# Patient Record
Sex: FEMALE | Race: WHITE | NOT HISPANIC OR LATINO | Employment: OTHER | URBAN - METROPOLITAN AREA
[De-identification: names, ages, dates, MRNs, and addresses within clinical notes are randomized per-mention and may not be internally consistent; named-entity substitution may affect disease eponyms.]

---

## 2017-10-09 ENCOUNTER — HOSPITAL ENCOUNTER (OUTPATIENT)
Facility: HOSPITAL | Age: 82
Discharge: HOME | End: 2017-10-10
Attending: INTERNAL MEDICINE | Admitting: INTERNAL MEDICINE
Payer: MEDICARE

## 2017-10-09 DIAGNOSIS — R10.9 ABDOMINAL PAIN: ICD-10-CM

## 2017-10-09 DIAGNOSIS — N39.0 URINARY TRACT INFECTION WITHOUT HEMATURIA, SITE UNSPECIFIED: Primary | ICD-10-CM

## 2017-10-09 DIAGNOSIS — R10.30 LOWER ABDOMINAL PAIN, UNSPECIFIED: ICD-10-CM

## 2017-10-09 DIAGNOSIS — R10.32 LEFT LOWER QUADRANT PAIN: ICD-10-CM

## 2017-10-09 DIAGNOSIS — J44.9 CHRONIC OBSTRUCTIVE PULMONARY DISEASE, UNSPECIFIED COPD TYPE: ICD-10-CM

## 2017-10-09 DIAGNOSIS — E11.9 NON-INSULIN DEPENDENT TYPE 2 DIABETES MELLITUS: ICD-10-CM

## 2017-10-09 LAB
ALBUMIN SERPL BCP-MCNC: 3.2 G/DL
ALP SERPL-CCNC: 112 U/L
ALT SERPL W/O P-5'-P-CCNC: 7 U/L
AMYLASE SERPL-CCNC: 7 U/L
ANION GAP SERPL CALC-SCNC: 11 MMOL/L
AST SERPL-CCNC: 17 U/L
BACTERIA #/AREA URNS HPF: ABNORMAL /HPF
BASOPHILS # BLD AUTO: 0 K/UL
BASOPHILS NFR BLD: 0.2 %
BILIRUB SERPL-MCNC: 0.2 MG/DL
BILIRUB UR QL STRIP: NEGATIVE
BUN SERPL-MCNC: 16 MG/DL
CALCIUM SERPL-MCNC: 8.2 MG/DL
CHLORIDE SERPL-SCNC: 109 MMOL/L
CLARITY UR: CLEAR
CO2 SERPL-SCNC: 18 MMOL/L
COLOR UR: YELLOW
CREAT SERPL-MCNC: 1.1 MG/DL
DIFFERENTIAL METHOD: ABNORMAL
EOSINOPHIL # BLD AUTO: 0.1 K/UL
EOSINOPHIL NFR BLD: 2 %
ERYTHROCYTE [DISTWIDTH] IN BLOOD BY AUTOMATED COUNT: 15.4 %
EST. GFR  (AFRICAN AMERICAN): 54 ML/MIN/1.73 M^2
EST. GFR  (NON AFRICAN AMERICAN): 47 ML/MIN/1.73 M^2
GLUCOSE SERPL-MCNC: 134 MG/DL
GLUCOSE UR QL STRIP: NEGATIVE
HCT VFR BLD AUTO: 41 %
HGB BLD-MCNC: 13.5 G/DL
HGB UR QL STRIP: NEGATIVE
KETONES UR QL STRIP: NEGATIVE
LEUKOCYTE ESTERASE UR QL STRIP: ABNORMAL
LYMPHOCYTES # BLD AUTO: 3 K/UL
LYMPHOCYTES NFR BLD: 45.6 %
MAGNESIUM SERPL-MCNC: 2.2 MG/DL
MCH RBC QN AUTO: 31.1 PG
MCHC RBC AUTO-ENTMCNC: 33 G/DL
MCV RBC AUTO: 94 FL
MICROSCOPIC COMMENT: ABNORMAL
MONOCYTES # BLD AUTO: 0.6 K/UL
MONOCYTES NFR BLD: 9.3 %
NEUTROPHILS # BLD AUTO: 2.9 K/UL
NEUTROPHILS NFR BLD: 42.9 %
NITRITE UR QL STRIP: NEGATIVE
PH UR STRIP: 7 [PH] (ref 5–8)
PHOSPHATE SERPL-MCNC: 3.1 MG/DL
PLATELET # BLD AUTO: 167 K/UL
PMV BLD AUTO: 9.2 FL
POCT GLUCOSE: 110 MG/DL (ref 70–110)
POTASSIUM SERPL-SCNC: 4.4 MMOL/L
PROT SERPL-MCNC: 6.4 G/DL
PROT UR QL STRIP: NEGATIVE
RBC # BLD AUTO: 4.35 M/UL
SODIUM SERPL-SCNC: 138 MMOL/L
SP GR UR STRIP: 1.01 (ref 1–1.03)
URN SPEC COLLECT METH UR: ABNORMAL
UROBILINOGEN UR STRIP-ACNC: NEGATIVE EU/DL
WBC # BLD AUTO: 6.7 K/UL
WBC #/AREA URNS HPF: 10 /HPF (ref 0–5)

## 2017-10-09 PROCEDURE — 94760 N-INVAS EAR/PLS OXIMETRY 1: CPT

## 2017-10-09 PROCEDURE — 83735 ASSAY OF MAGNESIUM: CPT

## 2017-10-09 PROCEDURE — 84100 ASSAY OF PHOSPHORUS: CPT

## 2017-10-09 PROCEDURE — 87086 URINE CULTURE/COLONY COUNT: CPT

## 2017-10-09 PROCEDURE — 36415 COLL VENOUS BLD VENIPUNCTURE: CPT

## 2017-10-09 PROCEDURE — 85025 COMPLETE CBC W/AUTO DIFF WBC: CPT

## 2017-10-09 PROCEDURE — 99219 PR INITIAL OBSERVATION CARE,LEVL II: CPT | Mod: ,,, | Performed by: INTERNAL MEDICINE

## 2017-10-09 PROCEDURE — 80053 COMPREHEN METABOLIC PANEL: CPT

## 2017-10-09 PROCEDURE — G0378 HOSPITAL OBSERVATION PER HR: HCPCS

## 2017-10-09 PROCEDURE — 82150 ASSAY OF AMYLASE: CPT

## 2017-10-09 PROCEDURE — 25000003 PHARM REV CODE 250: Performed by: INTERNAL MEDICINE

## 2017-10-09 PROCEDURE — 81000 URINALYSIS NONAUTO W/SCOPE: CPT

## 2017-10-09 PROCEDURE — 25500020 PHARM REV CODE 255

## 2017-10-09 PROCEDURE — G0379 DIRECT REFER HOSPITAL OBSERV: HCPCS

## 2017-10-09 PROCEDURE — 82962 GLUCOSE BLOOD TEST: CPT

## 2017-10-09 PROCEDURE — 99900035 HC TECH TIME PER 15 MIN (STAT)

## 2017-10-09 RX ORDER — POTASSIUM CHLORIDE 20 MEQ/15ML
40 SOLUTION ORAL
Status: DISCONTINUED | OUTPATIENT
Start: 2017-10-09 | End: 2017-10-10 | Stop reason: HOSPADM

## 2017-10-09 RX ORDER — ALENDRONATE SODIUM 70 MG/1
70 TABLET ORAL
COMMUNITY
End: 2018-04-04

## 2017-10-09 RX ORDER — BISACODYL 10 MG
10 SUPPOSITORY, RECTAL RECTAL DAILY PRN
Status: DISCONTINUED | OUTPATIENT
Start: 2017-10-09 | End: 2017-10-10 | Stop reason: HOSPADM

## 2017-10-09 RX ORDER — HYDROMORPHONE HYDROCHLORIDE 2 MG/1
2 TABLET ORAL 2 TIMES DAILY PRN
Status: DISCONTINUED | OUTPATIENT
Start: 2017-10-09 | End: 2017-10-10 | Stop reason: HOSPADM

## 2017-10-09 RX ORDER — HYDROMORPHONE HYDROCHLORIDE 8 MG/1
8 TABLET ORAL EVERY 4 HOURS PRN
COMMUNITY
End: 2018-04-04

## 2017-10-09 RX ORDER — SODIUM,POTASSIUM PHOSPHATES 280-250MG
2 POWDER IN PACKET (EA) ORAL
Status: DISCONTINUED | OUTPATIENT
Start: 2017-10-09 | End: 2017-10-10 | Stop reason: HOSPADM

## 2017-10-09 RX ORDER — METHOCARBAMOL 500 MG/1
500 TABLET, FILM COATED ORAL 2 TIMES DAILY
Status: DISCONTINUED | OUTPATIENT
Start: 2017-10-09 | End: 2017-10-09

## 2017-10-09 RX ORDER — LANOLIN ALCOHOL/MO/W.PET/CERES
800 CREAM (GRAM) TOPICAL
Status: DISCONTINUED | OUTPATIENT
Start: 2017-10-09 | End: 2017-10-10 | Stop reason: HOSPADM

## 2017-10-09 RX ORDER — DOCUSATE SODIUM 100 MG/1
100 CAPSULE, LIQUID FILLED ORAL DAILY
COMMUNITY
End: 2018-04-04

## 2017-10-09 RX ORDER — ONDANSETRON 4 MG/1
8 TABLET, FILM COATED ORAL 2 TIMES DAILY
COMMUNITY
Start: 2017-10-09 | End: 2017-10-24

## 2017-10-09 RX ORDER — IBUPROFEN 200 MG
24 TABLET ORAL
Status: DISCONTINUED | OUTPATIENT
Start: 2017-10-09 | End: 2017-10-10 | Stop reason: HOSPADM

## 2017-10-09 RX ORDER — GLUCAGON 1 MG
1 KIT INJECTION
Status: DISCONTINUED | OUTPATIENT
Start: 2017-10-09 | End: 2017-10-10 | Stop reason: HOSPADM

## 2017-10-09 RX ORDER — LORAZEPAM 0.5 MG/1
0.5 TABLET ORAL EVERY 6 HOURS PRN
Status: DISCONTINUED | OUTPATIENT
Start: 2017-10-09 | End: 2017-10-10 | Stop reason: HOSPADM

## 2017-10-09 RX ORDER — ENALAPRIL MALEATE 2.5 MG/1
2.5 TABLET ORAL DAILY
Status: DISCONTINUED | OUTPATIENT
Start: 2017-10-10 | End: 2017-10-10 | Stop reason: HOSPADM

## 2017-10-09 RX ORDER — INSULIN ASPART 100 [IU]/ML
0-5 INJECTION, SOLUTION INTRAVENOUS; SUBCUTANEOUS
Status: DISCONTINUED | OUTPATIENT
Start: 2017-10-09 | End: 2017-10-10 | Stop reason: HOSPADM

## 2017-10-09 RX ORDER — FLUTICASONE FUROATE AND VILANTEROL 100; 25 UG/1; UG/1
1 POWDER RESPIRATORY (INHALATION) DAILY
Status: DISCONTINUED | OUTPATIENT
Start: 2017-10-10 | End: 2017-10-10 | Stop reason: HOSPADM

## 2017-10-09 RX ORDER — QUETIAPINE 200 MG/1
200 TABLET, FILM COATED, EXTENDED RELEASE ORAL DAILY
Status: DISCONTINUED | OUTPATIENT
Start: 2017-10-10 | End: 2017-10-09 | Stop reason: CLARIF

## 2017-10-09 RX ORDER — POTASSIUM CHLORIDE 20 MEQ/15ML
60 SOLUTION ORAL
Status: DISCONTINUED | OUTPATIENT
Start: 2017-10-09 | End: 2017-10-10 | Stop reason: HOSPADM

## 2017-10-09 RX ORDER — GABAPENTIN 300 MG/1
300 CAPSULE ORAL NIGHTLY
COMMUNITY

## 2017-10-09 RX ORDER — IBUPROFEN 200 MG
16 TABLET ORAL
Status: DISCONTINUED | OUTPATIENT
Start: 2017-10-09 | End: 2017-10-10 | Stop reason: HOSPADM

## 2017-10-09 RX ORDER — RAMELTEON 8 MG/1
8 TABLET ORAL NIGHTLY PRN
Status: DISCONTINUED | OUTPATIENT
Start: 2017-10-09 | End: 2017-10-10 | Stop reason: HOSPADM

## 2017-10-09 RX ORDER — LUBIPROSTONE 8 UG/1
24 CAPSULE ORAL NIGHTLY
Status: DISCONTINUED | OUTPATIENT
Start: 2017-10-09 | End: 2017-10-10 | Stop reason: HOSPADM

## 2017-10-09 RX ORDER — ACETAMINOPHEN 325 MG/1
650 TABLET ORAL EVERY 8 HOURS PRN
Status: DISCONTINUED | OUTPATIENT
Start: 2017-10-09 | End: 2017-10-10 | Stop reason: HOSPADM

## 2017-10-09 RX ORDER — ONDANSETRON 2 MG/ML
8 INJECTION INTRAMUSCULAR; INTRAVENOUS EVERY 6 HOURS PRN
Status: DISCONTINUED | OUTPATIENT
Start: 2017-10-09 | End: 2017-10-10 | Stop reason: HOSPADM

## 2017-10-09 RX ORDER — HYDROCODONE BITARTRATE AND ACETAMINOPHEN 5; 325 MG/1; MG/1
1 TABLET ORAL EVERY 4 HOURS PRN
Status: DISCONTINUED | OUTPATIENT
Start: 2017-10-09 | End: 2017-10-10 | Stop reason: HOSPADM

## 2017-10-09 RX ORDER — QUETIAPINE FUMARATE 100 MG/1
100 TABLET, FILM COATED ORAL 2 TIMES DAILY
Status: DISCONTINUED | OUTPATIENT
Start: 2017-10-09 | End: 2017-10-10 | Stop reason: HOSPADM

## 2017-10-09 RX ORDER — PANTOPRAZOLE SODIUM 40 MG/10ML
40 INJECTION, POWDER, LYOPHILIZED, FOR SOLUTION INTRAVENOUS DAILY
Status: DISCONTINUED | OUTPATIENT
Start: 2017-10-10 | End: 2017-10-10 | Stop reason: HOSPADM

## 2017-10-09 RX ORDER — IPRATROPIUM BROMIDE AND ALBUTEROL SULFATE 2.5; .5 MG/3ML; MG/3ML
3 SOLUTION RESPIRATORY (INHALATION) EVERY 6 HOURS PRN
Status: DISCONTINUED | OUTPATIENT
Start: 2017-10-09 | End: 2017-10-09 | Stop reason: SDUPTHER

## 2017-10-09 RX ORDER — IPRATROPIUM BROMIDE AND ALBUTEROL SULFATE 2.5; .5 MG/3ML; MG/3ML
3 SOLUTION RESPIRATORY (INHALATION) EVERY 6 HOURS PRN
Status: DISCONTINUED | OUTPATIENT
Start: 2017-10-09 | End: 2017-10-10 | Stop reason: HOSPADM

## 2017-10-09 RX ORDER — SODIUM CHLORIDE 450 MG/100ML
INJECTION, SOLUTION INTRAVENOUS CONTINUOUS
Status: DISCONTINUED | OUTPATIENT
Start: 2017-10-09 | End: 2017-10-10 | Stop reason: HOSPADM

## 2017-10-09 RX ORDER — ENOXAPARIN SODIUM 100 MG/ML
40 INJECTION SUBCUTANEOUS EVERY 24 HOURS
Status: DISCONTINUED | OUTPATIENT
Start: 2017-10-09 | End: 2017-10-10 | Stop reason: HOSPADM

## 2017-10-09 RX ADMIN — QUETIAPINE FUMARATE 100 MG: 100 TABLET, FILM COATED ORAL at 08:10

## 2017-10-09 RX ADMIN — SODIUM CHLORIDE: 0.45 INJECTION, SOLUTION INTRAVENOUS at 06:10

## 2017-10-09 RX ADMIN — LUBIPROSTONE 24 MCG: 8 CAPSULE, GELATIN COATED ORAL at 08:10

## 2017-10-09 RX ADMIN — IOHEXOL 75 ML: 350 INJECTION, SOLUTION INTRAVENOUS at 08:10
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
## 2017-10-10 VITALS
DIASTOLIC BLOOD PRESSURE: 66 MMHG | RESPIRATION RATE: 16 BRPM | HEART RATE: 66 BPM | TEMPERATURE: 98 F | SYSTOLIC BLOOD PRESSURE: 138 MMHG | HEIGHT: 66 IN | WEIGHT: 106.5 LBS | BODY MASS INDEX: 17.12 KG/M2 | OXYGEN SATURATION: 96 %

## 2017-10-10 LAB
BASOPHILS # BLD AUTO: 0 K/UL
BASOPHILS NFR BLD: 0.8 %
DIFFERENTIAL METHOD: ABNORMAL
EOSINOPHIL # BLD AUTO: 0.2 K/UL
EOSINOPHIL NFR BLD: 3.5 %
ERYTHROCYTE [DISTWIDTH] IN BLOOD BY AUTOMATED COUNT: 14.6 %
HCT VFR BLD AUTO: 41 %
HGB BLD-MCNC: 13.4 G/DL
LIPASE SERPL-CCNC: 7 U/L
LYMPHOCYTES # BLD AUTO: 2.6 K/UL
LYMPHOCYTES NFR BLD: 46.2 %
MCH RBC QN AUTO: 30.6 PG
MCHC RBC AUTO-ENTMCNC: 32.7 G/DL
MCV RBC AUTO: 94 FL
MONOCYTES # BLD AUTO: 0.7 K/UL
MONOCYTES NFR BLD: 11.7 %
NEUTROPHILS # BLD AUTO: 2.2 K/UL
NEUTROPHILS NFR BLD: 37.8 %
PLATELET # BLD AUTO: 164 K/UL
PMV BLD AUTO: 9.7 FL
POCT GLUCOSE: 149 MG/DL (ref 70–110)
POCT GLUCOSE: 84 MG/DL (ref 70–110)
RBC # BLD AUTO: 4.39 M/UL
WBC # BLD AUTO: 5.7 K/UL

## 2017-10-10 PROCEDURE — 25000003 PHARM REV CODE 250: Performed by: INTERNAL MEDICINE

## 2017-10-10 PROCEDURE — C9113 INJ PANTOPRAZOLE SODIUM, VIA: HCPCS | Performed by: INTERNAL MEDICINE

## 2017-10-10 PROCEDURE — 96374 THER/PROPH/DIAG INJ IV PUSH: CPT

## 2017-10-10 PROCEDURE — 99217 PR OBSERVATION CARE DISCHARGE: CPT | Mod: ,,, | Performed by: INTERNAL MEDICINE

## 2017-10-10 PROCEDURE — G0378 HOSPITAL OBSERVATION PER HR: HCPCS

## 2017-10-10 PROCEDURE — 63600175 PHARM REV CODE 636 W HCPCS: Performed by: INTERNAL MEDICINE

## 2017-10-10 PROCEDURE — 94640 AIRWAY INHALATION TREATMENT: CPT

## 2017-10-10 PROCEDURE — 83690 ASSAY OF LIPASE: CPT

## 2017-10-10 PROCEDURE — 36415 COLL VENOUS BLD VENIPUNCTURE: CPT

## 2017-10-10 PROCEDURE — 99900035 HC TECH TIME PER 15 MIN (STAT)

## 2017-10-10 PROCEDURE — 25000242 PHARM REV CODE 250 ALT 637 W/ HCPCS: Performed by: INTERNAL MEDICINE

## 2017-10-10 PROCEDURE — 94760 N-INVAS EAR/PLS OXIMETRY 1: CPT

## 2017-10-10 PROCEDURE — 96366 THER/PROPH/DIAG IV INF ADDON: CPT

## 2017-10-10 PROCEDURE — 96361 HYDRATE IV INFUSION ADD-ON: CPT

## 2017-10-10 PROCEDURE — 85025 COMPLETE CBC W/AUTO DIFF WBC: CPT

## 2017-10-10 PROCEDURE — 82962 GLUCOSE BLOOD TEST: CPT

## 2017-10-10 RX ORDER — CIPROFLOXACIN 500 MG/1
500 TABLET ORAL EVERY 12 HOURS
Qty: 10 TABLET | Refills: 0 | Status: SHIPPED | OUTPATIENT
Start: 2017-10-10 | End: 2018-04-04

## 2017-10-10 RX ORDER — PHENAZOPYRIDINE HYDROCHLORIDE 100 MG/1
100 TABLET, FILM COATED ORAL 3 TIMES DAILY PRN
Qty: 10 TABLET | Refills: 0 | Status: SHIPPED | OUTPATIENT
Start: 2017-10-10 | End: 2017-10-20

## 2017-10-10 RX ORDER — CIPROFLOXACIN 500 MG/1
500 TABLET ORAL EVERY 12 HOURS
Status: DISCONTINUED | OUTPATIENT
Start: 2017-10-10 | End: 2017-10-10 | Stop reason: HOSPADM

## 2017-10-10 RX ORDER — METHOCARBAMOL 500 MG/1
500 TABLET, FILM COATED ORAL DAILY
Status: ON HOLD | COMMUNITY
End: 2018-04-04

## 2017-10-10 RX ORDER — METHOCARBAMOL 500 MG/1
500 TABLET, FILM COATED ORAL 2 TIMES DAILY PRN
Status: DISCONTINUED | OUTPATIENT
Start: 2017-10-10 | End: 2017-10-10 | Stop reason: HOSPADM

## 2017-10-10 RX ORDER — DOCUSATE SODIUM 100 MG/1
100 CAPSULE, LIQUID FILLED ORAL DAILY
Status: DISCONTINUED | OUTPATIENT
Start: 2017-10-10 | End: 2017-10-10 | Stop reason: HOSPADM

## 2017-10-10 RX ADMIN — LEVOTHYROXINE SODIUM 125 MCG: 25 TABLET ORAL at 05:10

## 2017-10-10 RX ADMIN — BISACODYL 10 MG: 10 SUPPOSITORY RECTAL at 10:10

## 2017-10-10 RX ADMIN — SODIUM CHLORIDE: 0.45 INJECTION, SOLUTION INTRAVENOUS at 06:10

## 2017-10-10 RX ADMIN — HYDROMORPHONE HYDROCHLORIDE 2 MG: 2 TABLET ORAL at 09:10

## 2017-10-10 RX ADMIN — FLUTICASONE FUROATE AND VILANTEROL TRIFENATATE 1 PUFF: 100; 25 POWDER RESPIRATORY (INHALATION) at 07:10

## 2017-10-10 RX ADMIN — DOCUSATE SODIUM 100 MG: 100 CAPSULE, LIQUID FILLED ORAL at 10:10

## 2017-10-10 RX ADMIN — PANTOPRAZOLE SODIUM 40 MG: 40 INJECTION, POWDER, FOR SOLUTION INTRAVENOUS at 09:10

## 2017-10-10 RX ADMIN — ENALAPRIL MALEATE 2.5 MG: 2.5 TABLET ORAL at 09:10

## 2017-10-10 RX ADMIN — LORAZEPAM 0.5 MG: 0.5 TABLET ORAL at 12:10

## 2017-10-10 RX ADMIN — CIPROFLOXACIN 500 MG: 500 TABLET, FILM COATED ORAL at 10:10

## 2017-10-10 RX ADMIN — LORAZEPAM 0.5 MG: 0.5 TABLET ORAL at 01:10

## 2017-10-10 RX ADMIN — METHOCARBAMOL 500 MG: 500 TABLET ORAL at 10:10

## 2017-10-10 RX ADMIN — HYDROCODONE BITARTRATE AND ACETAMINOPHEN 1 TABLET: 5; 325 TABLET ORAL at 06:10

## 2017-10-10 RX ADMIN — QUETIAPINE FUMARATE 100 MG: 100 TABLET, FILM COATED ORAL at 09:10

## 2017-10-10 NOTE — PLAN OF CARE
Spoke with the pt's nurse, Oksana regarding the CT abd results. Dr Esets is unable to determine plan of care or discharge without the results.....ALEC Hidalgo CM

## 2017-10-10 NOTE — PLAN OF CARE
CT abd results received at 11:12am and Dr Estes put discharge orders in Epic at 11:58am....Monroe Hidalgo CM

## 2017-10-10 NOTE — NURSING
Spoke with Ct and advised it was ok to give patient medications that it will not hinder any testing.

## 2017-10-10 NOTE — PLAN OF CARE
10/10/17 0742   Patient Assessment/Suction   Level of Consciousness (AVPU) alert   All Lung Fields Breath Sounds diminished   PRE-TX-O2-ETCO2   O2 Device (Oxygen Therapy) room air   SpO2 97 %   Pulse 60   Resp (!) 97   Aerosol Therapy   $ Aerosol Therapy Charges PRN treatment not required   Inhaler   $ Inhaler Charges MDI (Metered Dose Inahler) Treatment;Mouth rinsed post treatment   Respiratory Treatment Status given   SVN/Inhaler Treatment Route mouthpiece   Patient Tolerance good   Post-Treatment   Post-treatment Heart Rate (beats/min) 62   Post-treatment Resp Rate (breaths/min) 18   All Fields Breath Sounds unchanged

## 2017-10-10 NOTE — PLAN OF CARE
Problem: Patient Care Overview  Goal: Plan of Care Review  Outcome: Outcome(s) achieved Date Met: 10/10/17  Pt assisted to BSC as needed. Pt's daughter called to  pt after her procedure.

## 2017-10-10 NOTE — DISCHARGE SUMMARY
Discharge Summary  Hospital Medicine    Admit Date: 10/9/2017    Date and Time: 10/10/740355:59 AM    Discharge Attending Physician: Josselyn Estes MD    Primary Care Physician: Nikhil Albrecht MD    Diagnoses:  Active Hospital Problems    Diagnosis  POA    *Left lower quadrant pain [R10.32] secondary to acute cystitis  Acute Cystitis  Yes    Non-insulin dependent type 2 diabetes mellitus [E11.9]  Yes    Chronic obstructive pulmonary disease [J44.9]  Yes    Chronic low back pain [M54.5, G89.29]  Yes    Crohn's disease without complication [K50.90]  Yes                    Discharged Condition: Good    Hospital Course:   Patient is a 83 y.o. female admitted to Hospitalist Service from Dr. Albrecht's office with complaint of lower abdominal pain. Patient has PMH significant for DM-2, hypothyroidism, Crohn's disease, GERD, hyperlipidemia and COPD. Patient is a poor historian and exhibiting opiate seeking behavior. Patient has an implantable Dilaudid pump managed by pain specialist. Part of the history obtained from patient's daughter. Patient had been having off and on lower abdominal pain. Patient denied any melena, bleeding per rectum, diarrhea, mucus per rectum, fever, nausea or vomiting. Patient has low appetite and not eating and drinking much and has lost some weight over few months. Patient reported abdominal pain as bladder spasm pain. No definite dysuria reported. Patient denied chest pain, shortness of breath, headache, vision changes, focal neuro-deficits, cough or fever. Patient was admitted to Hospitalist medicine service. Patient was noted to ave acute UTI which was treated with IV antibiotics. Symptoms improved. Patient exhibited opiate seeking behavior during hospital stay. Symptoms improved. Patient was discharged home in stable condition with following discharge plan of care.     Consults: None    Significant Diagnostic Studies:   Large hiatal hernia with most of the stomach seen in the right  hemithorax. Atelectasis of the right lower lobe small right pleural effusion with trace left pleural effusion. Prior cholecystectomy prior appendectomy. Prior hysterectomy. Extensive atherosclerotic calcification of the aorta, pelvic vessels, renal and mesenteric vessels. Right intrarenal stone without hydronephrosis. A TENS battery pack is identified in the left abdominal wall. Constipation.    Microbiology Results (last 7 days)     Procedure Component Value Units Date/Time    Urine culture [[id number]] Collected:  10/09/17 1938    Order Status:  Sent Specimen:  Urine from Urine, Clean Catch Updated:  10/10/17 1007        Special Treatments/Procedures: None  Disposition: Home or Self Care    Medications:  Reconciled Home Medications: Current Discharge Medication List      START taking these medications    Details   ciprofloxacin HCl (CIPRO) 500 MG tablet Take 1 tablet (500 mg total) by mouth every 12 (twelve) hours.  Qty: 10 tablet, Refills: 0      phenazopyridine (PYRIDIUM) 100 MG tablet Take 1 tablet (100 mg total) by mouth 3 (three) times daily as needed for Pain.  Qty: 10 tablet, Refills: 0         CONTINUE these medications which have NOT CHANGED    Details   alendronate (FOSAMAX) 70 MG tablet Take 70 mg by mouth every 7 days.      CIMZIA 400 mg/2 mL (200 mg/mL x 2) SyKt kit Once monthly with last dose 12/10/15      docusate sodium (COLACE) 100 MG capsule Take 100 mg by mouth once daily.      enalapril (VASOTEC) 2.5 MG tablet Take 5 mg by mouth once daily.       fluticasone-vilanterol (BREO ELLIPTA) 100-25 mcg/dose diskus inhaler Inhale 1 puff into the lungs once daily.      gabapentin (NEURONTIN) 300 MG capsule Take 300 mg by mouth 2 (two) times daily.      HYDROmorphone (DILAUDID) 8 MG tablet Take 8 mg by mouth every 4 (four) hours as needed for Pain. Take 1/2 tab to 1 tab by mouth q4-6h prn pain      HYDROMORPHONE HCL (HYDROMORPHONE, BULK, MISC) Inject 6.2117 mg into the vein once daily. Implanted pain pump       levothyroxine (SYNTHROID) 75 MCG tablet Take 125 mcg by mouth once daily.       lorazepam (ATIVAN) 1 MG tablet Take 1 tablet (1 mg total) by mouth every 8 (eight) hours as needed for Anxiety.      lubiprostone (AMITIZA) 24 MCG Cap Take 24 mcg by mouth 2 (two) times daily with meals.       methocarbamol (ROBAXIN) 500 MG Tab Take 500 mg by mouth 2 (two) times daily as needed.      ondansetron (ZOFRAN) 4 MG tablet Take 8 mg by mouth 2 (two) times daily.      pantoprazole (PROTONIX) 40 MG tablet Take 40 mg by mouth once daily.       quetiapine (SEROQUEL XR) 200 MG Tb24 Take by mouth every evening.       ranitidine (ZANTAC) 75 MG tablet Take 150 mg by mouth nightly.       simvastatin (ZOCOR) 40 MG tablet Take 40 mg by mouth once daily.       temazepam (RESTORIL) 22.5 MG capsule Take 30 mg by mouth every evening.       ZETIA 10 mg tablet every evening.       albuterol-ipratropium 2.5mg-0.5mg/3mL (DUO-NEB) 0.5 mg-3 mg(2.5 mg base)/3 mL nebulizer solution Take 3 mLs by nebulization every 6 (six) hours as needed for Wheezing.  Qty: 120 vial, Refills: 0         STOP taking these medications       albuterol 90 mcg/actuation inhaler Comments:   Reason for Stopping:               Discharge Procedure Orders  Diet general   Order Comments: Cardiac/ 2 gram sodium low cholesterol diet     Diet Diabetic 1800 Calories     Other restrictions (specify):   Order Comments: Fall precautions     Call MD for:   Order Comments: For worsening symptoms, chest pain, shortness of breath, increased abdominal pain, high grade fever, stroke or stroke like symptoms, immediately go to the nearest Emergency Room or call 911 as soon as possible.       Follow-up Information     Nikhil Albrecht MD On 10/27/2017.    Specialty:  Internal Medicine  Why:  @10:00am   Contact information:  [address] [address] [address]  [phone]

## 2017-10-10 NOTE — PLAN OF CARE
Problem: Patient Care Overview  Goal: Plan of Care Review  Outcome: Ongoing (interventions implemented as appropriate)  Patient alert and oriented resting in bed. NAD. Denies SOB. C/o abd pain and bladder spasms, Prn medication given with relief. Room air.    VSS. Plan of care reviewed with patient. Verbalizes understanding.Call light in reach. Pt free from fall or injury. Will monitor.

## 2017-10-10 NOTE — PLAN OF CARE
10/09/17 1942   Patient Assessment/Suction   Level of Consciousness (AVPU) alert  (Simultaneous filing. User may not have seen previous data.)   Respiratory Effort Normal;Unlabored   Expansion/Accessory Muscles/Retractions no use of accessory muscles   PRE-TX-O2-ETCO2   O2 Device (Oxygen Therapy) room air  (Simultaneous filing. User may not have seen previous data.)   SpO2 96 %  (Simultaneous filing. User may not have seen previous data.)   Pulse (!) 56  (Simultaneous filing. User may not have seen previous data.)   Resp 17   Temp 98 °F (36.7 °C)   BP (!) 174/74   Aerosol Therapy   $ Aerosol Therapy Charges PRN treatment not required   Respiratory Treatment Status PRN treatment not required   Pt tolerates RA and treatments well.

## 2017-10-10 NOTE — PLAN OF CARE
"PCP is Dr Albrecht.  Verified insurance as [employer].  Pharmacy is [facility]; added in epic.  Patient lives alone in a "little cottage" behind her daughter Sepideh's home.  Sepideh is her POA and she drives patient as needed.  Patient denies HH, however states she goes twice a week for therapy at [facility] in [place].  D/c plan is home; no needs anticipated.       10/10/17 1138   Discharge Assessment   Assessment Type Discharge Planning Assessment   Confirmed/corrected address and phone number on facesheet? Yes   Assessment information obtained from? Patient   Prior to hospitilization cognitive status: Alert/Oriented   Prior to hospitalization functional status: Independent;Assistive Equipment   Current cognitive status: Alert/Oriented   Current Functional Status: Independent;Assistive Equipment   Lives With alone   Able to Return to Prior Arrangements yes   Is patient able to care for self after discharge? Yes   Patient's perception of discharge disposition home or selfcare   Readmission Within The Last 30 Days no previous admission in last 30 days   Patient currently being followed by outpatient case management? No   Patient currently receives any other outside agency services? Yes   Name and contact number of agency or person providing outside services Outpatient PT at [facility] in [place]   Equipment Currently Used at Home oxygen;nebulizer;cane, straight;walker, rolling;wheelchair;shower chair;bedside commode  (oxygen is prn)   Do you have any problems affording any of your prescribed medications? No   Is the patient taking medications as prescribed? yes   Does the patient have transportation home? Yes   Transportation Available family or friend will provide   Does the patient receive services at the [facility]? No   Discharge Plan A Home   Patient/Family In Agreement With Plan yes     "

## 2017-10-11 NOTE — PLAN OF CARE
10/11/17 1032   Final Note   Assessment Type Final Discharge Note   Discharge Disposition Home   Hospital Follow Up  Appt(s) scheduled? Yes   Discharge plans and expectations educations in teach back method with documentation complete? Yes

## 2017-10-12 LAB
BACTERIA UR CULT: NORMAL
BACTERIA UR CULT: NORMAL

## 2018-04-04 ENCOUNTER — HOSPITAL ENCOUNTER (OUTPATIENT)
Facility: HOSPITAL | Age: 83
LOS: 1 days | Discharge: NURSING, REHAB OR LONG-TERM CARE | End: 2018-04-06
Attending: EMERGENCY MEDICINE | Admitting: INTERNAL MEDICINE
Payer: MEDICARE

## 2018-04-04 DIAGNOSIS — R79.89 PRERENAL AZOTEMIA: ICD-10-CM

## 2018-04-04 DIAGNOSIS — E11.69 TYPE 2 DIABETES MELLITUS WITH OTHER SPECIFIED COMPLICATION, UNSPECIFIED LONG TERM INSULIN USE STATUS: ICD-10-CM

## 2018-04-04 DIAGNOSIS — R53.81 DEBILITY: ICD-10-CM

## 2018-04-04 DIAGNOSIS — D64.9 NORMOCYTIC ANEMIA: ICD-10-CM

## 2018-04-04 DIAGNOSIS — G30.9 ALZHEIMER'S DEMENTIA WITHOUT BEHAVIORAL DISTURBANCE, UNSPECIFIED TIMING OF DEMENTIA ONSET: ICD-10-CM

## 2018-04-04 DIAGNOSIS — E86.0 DEHYDRATION: ICD-10-CM

## 2018-04-04 DIAGNOSIS — D64.9 ANEMIA, UNSPECIFIED TYPE: Primary | ICD-10-CM

## 2018-04-04 DIAGNOSIS — R41.82 ALTERED MENTAL STATUS, UNSPECIFIED ALTERED MENTAL STATUS TYPE: ICD-10-CM

## 2018-04-04 DIAGNOSIS — J44.9 CHRONIC OBSTRUCTIVE PULMONARY DISEASE, UNSPECIFIED COPD TYPE: ICD-10-CM

## 2018-04-04 DIAGNOSIS — R41.82 ALTERED MENTAL STATUS: ICD-10-CM

## 2018-04-04 DIAGNOSIS — F02.80 ALZHEIMER'S DEMENTIA WITHOUT BEHAVIORAL DISTURBANCE, UNSPECIFIED TIMING OF DEMENTIA ONSET: ICD-10-CM

## 2018-04-04 PROBLEM — I95.9 HYPOTENSION: Status: ACTIVE | Noted: 2018-04-04

## 2018-04-04 PROBLEM — R31.9 HEMATURIA: Status: ACTIVE | Noted: 2018-04-04

## 2018-04-04 PROBLEM — F03.90 DEMENTIA: Status: ACTIVE | Noted: 2018-04-04

## 2018-04-04 PROBLEM — Z98.890 STATUS POST HIP SURGERY: Status: ACTIVE | Noted: 2018-04-04

## 2018-04-04 PROBLEM — K44.9 HERNIA, HIATAL: Status: ACTIVE | Noted: 2018-04-04

## 2018-04-04 PROBLEM — J18.9 PNEUMONIA: Status: ACTIVE | Noted: 2018-04-04

## 2018-04-04 PROBLEM — G89.4 CHRONIC PAIN SYNDROME: Status: ACTIVE | Noted: 2018-04-04

## 2018-04-04 LAB
ABO + RH BLD: NORMAL
ALBUMIN SERPL BCP-MCNC: 2.3 G/DL
ALP SERPL-CCNC: 93 U/L
ALT SERPL W/O P-5'-P-CCNC: 31 U/L
ANION GAP SERPL CALC-SCNC: 8 MMOL/L
AST SERPL-CCNC: 58 U/L
BACTERIA #/AREA URNS HPF: ABNORMAL /HPF
BACTERIA #/AREA URNS HPF: ABNORMAL /HPF
BASOPHILS # BLD AUTO: 0 K/UL
BASOPHILS NFR BLD: 0.4 %
BILIRUB SERPL-MCNC: 0.2 MG/DL
BILIRUB UR QL STRIP: NEGATIVE
BILIRUB UR QL STRIP: NEGATIVE
BLD GP AB SCN CELLS X3 SERPL QL: NORMAL
BUN SERPL-MCNC: 24 MG/DL
CALCIUM SERPL-MCNC: 8.2 MG/DL
CHLORIDE SERPL-SCNC: 103 MMOL/L
CLARITY UR: CLEAR
CLARITY UR: CLEAR
CO2 SERPL-SCNC: 26 MMOL/L
COLOR UR: YELLOW
COLOR UR: YELLOW
CREAT SERPL-MCNC: 1.1 MG/DL
DIFFERENTIAL METHOD: ABNORMAL
EOSINOPHIL # BLD AUTO: 0 K/UL
EOSINOPHIL NFR BLD: 0.5 %
ERYTHROCYTE [DISTWIDTH] IN BLOOD BY AUTOMATED COUNT: 15.2 %
EST. GFR  (AFRICAN AMERICAN): 54 ML/MIN/1.73 M^2
EST. GFR  (NON AFRICAN AMERICAN): 47 ML/MIN/1.73 M^2
FERRITIN SERPL-MCNC: 156 NG/ML
FOLATE SERPL-MCNC: 8.5 NG/ML
GLUCOSE SERPL-MCNC: 123 MG/DL
GLUCOSE SERPL-MCNC: 151 MG/DL (ref 70–110)
GLUCOSE UR QL STRIP: NEGATIVE
GLUCOSE UR QL STRIP: NEGATIVE
HCT VFR BLD AUTO: 26.3 %
HGB BLD-MCNC: 8.4 G/DL
HGB UR QL STRIP: ABNORMAL
HGB UR QL STRIP: ABNORMAL
INR PPP: 1
IRON SERPL-MCNC: 16 UG/DL
KETONES UR QL STRIP: NEGATIVE
KETONES UR QL STRIP: NEGATIVE
LEUKOCYTE ESTERASE UR QL STRIP: ABNORMAL
LEUKOCYTE ESTERASE UR QL STRIP: ABNORMAL
LYMPHOCYTES # BLD AUTO: 1.3 K/UL
LYMPHOCYTES NFR BLD: 21.2 %
MCH RBC QN AUTO: 30.4 PG
MCHC RBC AUTO-ENTMCNC: 32 G/DL
MCV RBC AUTO: 95 FL
MICROSCOPIC COMMENT: ABNORMAL
MICROSCOPIC COMMENT: ABNORMAL
MONOCYTES # BLD AUTO: 0.4 K/UL
MONOCYTES NFR BLD: 7.1 %
NEUTROPHILS # BLD AUTO: 4.2 K/UL
NEUTROPHILS NFR BLD: 70.8 %
NITRITE UR QL STRIP: NEGATIVE
NITRITE UR QL STRIP: NEGATIVE
PH UR STRIP: 7 [PH] (ref 5–8)
PH UR STRIP: 7 [PH] (ref 5–8)
PLATELET # BLD AUTO: 286 K/UL
PMV BLD AUTO: 7.9 FL
POCT GLUCOSE: 151 MG/DL (ref 70–110)
POTASSIUM SERPL-SCNC: 4.9 MMOL/L
PROT SERPL-MCNC: 6.3 G/DL
PROT UR QL STRIP: ABNORMAL
PROT UR QL STRIP: NEGATIVE
PROTHROMBIN TIME: 10 SEC
RBC # BLD AUTO: 2.77 M/UL
RBC #/AREA URNS HPF: 15 /HPF (ref 0–4)
RBC #/AREA URNS HPF: 3 /HPF (ref 0–4)
RETICS/RBC NFR AUTO: 1.1 %
SATURATED IRON: 8 %
SODIUM SERPL-SCNC: 137 MMOL/L
SP GR UR STRIP: 1.01 (ref 1–1.03)
SP GR UR STRIP: 1.01 (ref 1–1.03)
SQUAMOUS #/AREA URNS HPF: 0 /HPF
SQUAMOUS #/AREA URNS HPF: 2 /HPF
TOTAL IRON BINDING CAPACITY: 189 UG/DL
TRANSFERRIN SERPL-MCNC: 128 MG/DL
URN SPEC COLLECT METH UR: ABNORMAL
URN SPEC COLLECT METH UR: ABNORMAL
UROBILINOGEN UR STRIP-ACNC: 1 EU/DL
UROBILINOGEN UR STRIP-ACNC: NEGATIVE EU/DL
VIT B12 SERPL-MCNC: 425 PG/ML
WBC # BLD AUTO: 5.9 K/UL
WBC #/AREA URNS HPF: 4 /HPF (ref 0–5)
WBC #/AREA URNS HPF: 6 /HPF (ref 0–5)

## 2018-04-04 PROCEDURE — 94640 AIRWAY INHALATION TREATMENT: CPT

## 2018-04-04 PROCEDURE — 87186 SC STD MICRODIL/AGAR DIL: CPT

## 2018-04-04 PROCEDURE — 85025 COMPLETE CBC W/AUTO DIFF WBC: CPT

## 2018-04-04 PROCEDURE — 25000242 PHARM REV CODE 250 ALT 637 W/ HCPCS: Performed by: NURSE PRACTITIONER

## 2018-04-04 PROCEDURE — 99219 PR INITIAL OBSERVATION CARE,LEVL II: CPT | Mod: ,,, | Performed by: INTERNAL MEDICINE

## 2018-04-04 PROCEDURE — 25000003 PHARM REV CODE 250: Performed by: HOSPITALIST

## 2018-04-04 PROCEDURE — 86901 BLOOD TYPING SEROLOGIC RH(D): CPT

## 2018-04-04 PROCEDURE — 96360 HYDRATION IV INFUSION INIT: CPT

## 2018-04-04 PROCEDURE — 82607 VITAMIN B-12: CPT

## 2018-04-04 PROCEDURE — 99285 EMERGENCY DEPT VISIT HI MDM: CPT | Mod: 25

## 2018-04-04 PROCEDURE — 87077 CULTURE AEROBIC IDENTIFY: CPT

## 2018-04-04 PROCEDURE — 82728 ASSAY OF FERRITIN: CPT

## 2018-04-04 PROCEDURE — 63600175 PHARM REV CODE 636 W HCPCS: Performed by: NURSE PRACTITIONER

## 2018-04-04 PROCEDURE — 87086 URINE CULTURE/COLONY COUNT: CPT

## 2018-04-04 PROCEDURE — G0378 HOSPITAL OBSERVATION PER HR: HCPCS

## 2018-04-04 PROCEDURE — 82746 ASSAY OF FOLIC ACID SERUM: CPT

## 2018-04-04 PROCEDURE — 36415 COLL VENOUS BLD VENIPUNCTURE: CPT

## 2018-04-04 PROCEDURE — 85610 PROTHROMBIN TIME: CPT

## 2018-04-04 PROCEDURE — 12000002 HC ACUTE/MED SURGE SEMI-PRIVATE ROOM

## 2018-04-04 PROCEDURE — 25000003 PHARM REV CODE 250: Performed by: NURSE PRACTITIONER

## 2018-04-04 PROCEDURE — 87088 URINE BACTERIA CULTURE: CPT

## 2018-04-04 PROCEDURE — 25000003 PHARM REV CODE 250: Performed by: EMERGENCY MEDICINE

## 2018-04-04 PROCEDURE — P9612 CATHETERIZE FOR URINE SPEC: HCPCS

## 2018-04-04 PROCEDURE — 82962 GLUCOSE BLOOD TEST: CPT

## 2018-04-04 PROCEDURE — 83540 ASSAY OF IRON: CPT

## 2018-04-04 PROCEDURE — 85045 AUTOMATED RETICULOCYTE COUNT: CPT

## 2018-04-04 PROCEDURE — 81000 URINALYSIS NONAUTO W/SCOPE: CPT

## 2018-04-04 PROCEDURE — 80053 COMPREHEN METABOLIC PANEL: CPT

## 2018-04-04 PROCEDURE — 93005 ELECTROCARDIOGRAM TRACING: CPT

## 2018-04-04 PROCEDURE — 86920 COMPATIBILITY TEST SPIN: CPT

## 2018-04-04 RX ORDER — IBUPROFEN 200 MG
16 TABLET ORAL
Status: DISCONTINUED | OUTPATIENT
Start: 2018-04-04 | End: 2018-04-07 | Stop reason: HOSPADM

## 2018-04-04 RX ORDER — IPRATROPIUM BROMIDE AND ALBUTEROL SULFATE 2.5; .5 MG/3ML; MG/3ML
3 SOLUTION RESPIRATORY (INHALATION) EVERY 4 HOURS
Status: DISCONTINUED | OUTPATIENT
Start: 2018-04-04 | End: 2018-04-07 | Stop reason: HOSPADM

## 2018-04-04 RX ORDER — ASCORBIC ACID 500 MG
500 TABLET ORAL NIGHTLY
Status: DISCONTINUED | OUTPATIENT
Start: 2018-04-04 | End: 2018-04-07 | Stop reason: HOSPADM

## 2018-04-04 RX ORDER — SODIUM CHLORIDE 9 MG/ML
INJECTION, SOLUTION INTRAVENOUS CONTINUOUS
Status: DISCONTINUED | OUTPATIENT
Start: 2018-04-04 | End: 2018-04-07 | Stop reason: HOSPADM

## 2018-04-04 RX ORDER — GLUCAGON 1 MG
1 KIT INJECTION
Status: DISCONTINUED | OUTPATIENT
Start: 2018-04-04 | End: 2018-04-07 | Stop reason: HOSPADM

## 2018-04-04 RX ORDER — ZIPRASIDONE MESYLATE 20 MG/ML
10 INJECTION, POWDER, LYOPHILIZED, FOR SOLUTION INTRAMUSCULAR EVERY 6 HOURS PRN
Status: DISCONTINUED | OUTPATIENT
Start: 2018-04-04 | End: 2018-04-07 | Stop reason: HOSPADM

## 2018-04-04 RX ORDER — ASPIRIN 325 MG
325 TABLET, DELAYED RELEASE (ENTERIC COATED) ORAL 2 TIMES DAILY
Status: ON HOLD | COMMUNITY
End: 2018-04-04

## 2018-04-04 RX ORDER — ONDANSETRON 8 MG/1
8 TABLET, ORALLY DISINTEGRATING ORAL EVERY 12 HOURS PRN
COMMUNITY

## 2018-04-04 RX ORDER — LEVOTHYROXINE SODIUM 25 UG/1
25 TABLET ORAL
Status: DISCONTINUED | OUTPATIENT
Start: 2018-04-05 | End: 2018-04-07 | Stop reason: HOSPADM

## 2018-04-04 RX ORDER — GABAPENTIN 100 MG/1
100 CAPSULE ORAL 3 TIMES DAILY
Status: DISCONTINUED | OUTPATIENT
Start: 2018-04-04 | End: 2018-04-07 | Stop reason: HOSPADM

## 2018-04-04 RX ORDER — ACETAMINOPHEN 325 MG/1
650 TABLET ORAL EVERY 6 HOURS PRN
Status: DISCONTINUED | OUTPATIENT
Start: 2018-04-04 | End: 2018-04-07 | Stop reason: HOSPADM

## 2018-04-04 RX ORDER — LUBIPROSTONE 8 UG/1
24 CAPSULE ORAL NIGHTLY
Status: DISCONTINUED | OUTPATIENT
Start: 2018-04-04 | End: 2018-04-07 | Stop reason: HOSPADM

## 2018-04-04 RX ORDER — ONDANSETRON HYDROCHLORIDE 4 MG/5ML
4 SOLUTION ORAL EVERY 6 HOURS PRN
Status: DISCONTINUED | OUTPATIENT
Start: 2018-04-04 | End: 2018-04-07 | Stop reason: HOSPADM

## 2018-04-04 RX ORDER — DOCUSATE CALCIUM 240 MG
240 CAPSULE ORAL DAILY
Status: DISCONTINUED | OUTPATIENT
Start: 2018-04-05 | End: 2018-04-07 | Stop reason: HOSPADM

## 2018-04-04 RX ORDER — INSULIN ASPART 100 [IU]/ML
0-5 INJECTION, SOLUTION INTRAVENOUS; SUBCUTANEOUS
Status: DISCONTINUED | OUTPATIENT
Start: 2018-04-04 | End: 2018-04-07 | Stop reason: HOSPADM

## 2018-04-04 RX ORDER — OXYCODONE HYDROCHLORIDE 5 MG/1
5 TABLET ORAL EVERY 4 HOURS PRN
Status: ON HOLD | COMMUNITY
End: 2018-09-22 | Stop reason: CLARIF

## 2018-04-04 RX ORDER — QUETIAPINE FUMARATE 25 MG/1
25 TABLET, FILM COATED ORAL NIGHTLY
Status: DISCONTINUED | OUTPATIENT
Start: 2018-04-04 | End: 2018-04-07 | Stop reason: HOSPADM

## 2018-04-04 RX ORDER — PANTOPRAZOLE SODIUM 40 MG/1
40 TABLET, DELAYED RELEASE ORAL DAILY
Status: DISCONTINUED | OUTPATIENT
Start: 2018-04-05 | End: 2018-04-07 | Stop reason: HOSPADM

## 2018-04-04 RX ORDER — SIMVASTATIN 10 MG/1
40 TABLET, FILM COATED ORAL NIGHTLY
Status: DISCONTINUED | OUTPATIENT
Start: 2018-04-04 | End: 2018-04-07 | Stop reason: HOSPADM

## 2018-04-04 RX ORDER — FERROUS SULFATE 325(65) MG
325 TABLET, DELAYED RELEASE (ENTERIC COATED) ORAL 2 TIMES DAILY WITH MEALS
Status: DISCONTINUED | OUTPATIENT
Start: 2018-04-04 | End: 2018-04-07 | Stop reason: HOSPADM

## 2018-04-04 RX ORDER — QUETIAPINE FUMARATE 25 MG/1
50 TABLET, FILM COATED ORAL NIGHTLY
Status: DISCONTINUED | OUTPATIENT
Start: 2018-04-04 | End: 2018-04-04

## 2018-04-04 RX ORDER — IBUPROFEN 200 MG
24 TABLET ORAL
Status: DISCONTINUED | OUTPATIENT
Start: 2018-04-04 | End: 2018-04-07 | Stop reason: HOSPADM

## 2018-04-04 RX ORDER — DOCUSATE CALCIUM 240 MG
240 CAPSULE ORAL DAILY
COMMUNITY
End: 2018-09-22 | Stop reason: ALTCHOICE

## 2018-04-04 RX ORDER — ASPIRIN 325 MG
325 TABLET, DELAYED RELEASE (ENTERIC COATED) ORAL 2 TIMES DAILY
Status: DISCONTINUED | OUTPATIENT
Start: 2018-04-04 | End: 2018-04-07 | Stop reason: HOSPADM

## 2018-04-04 RX ORDER — HALOPERIDOL 1 MG/1
2 TABLET ORAL
Status: DISCONTINUED | OUTPATIENT
Start: 2018-04-04 | End: 2018-04-07 | Stop reason: HOSPADM

## 2018-04-04 RX ORDER — EZETIMIBE 10 MG/1
10 TABLET ORAL DAILY
Status: DISCONTINUED | OUTPATIENT
Start: 2018-04-05 | End: 2018-04-07 | Stop reason: HOSPADM

## 2018-04-04 RX ADMIN — MOXIFLOXACIN HYDROCHLORIDE 400 MG: 400 INJECTION, SOLUTION INTRAVENOUS at 08:04

## 2018-04-04 RX ADMIN — FERROUS SULFATE TAB EC 325 MG (65 MG FE EQUIVALENT) 325 MG: 325 (65 FE) TABLET DELAYED RESPONSE at 08:04

## 2018-04-04 RX ADMIN — LUBIPROSTONE 24 MCG: 8 CAPSULE, GELATIN COATED ORAL at 08:04

## 2018-04-04 RX ADMIN — IPRATROPIUM BROMIDE AND ALBUTEROL SULFATE 3 ML: .5; 3 SOLUTION RESPIRATORY (INHALATION) at 08:04

## 2018-04-04 RX ADMIN — SIMVASTATIN 40 MG: 10 TABLET, FILM COATED ORAL at 08:04

## 2018-04-04 RX ADMIN — QUETIAPINE FUMARATE 25 MG: 25 TABLET ORAL at 08:04

## 2018-04-04 RX ADMIN — EPOETIN ALFA 10000 UNITS: 10000 SOLUTION INTRAVENOUS; SUBCUTANEOUS at 10:04

## 2018-04-04 RX ADMIN — GABAPENTIN 100 MG: 100 CAPSULE ORAL at 08:04

## 2018-04-04 RX ADMIN — OXYCODONE HYDROCHLORIDE AND ACETAMINOPHEN 500 MG: 500 TABLET ORAL at 08:04

## 2018-04-04 RX ADMIN — SODIUM CHLORIDE: 0.9 INJECTION, SOLUTION INTRAVENOUS at 07:04

## 2018-04-04 RX ADMIN — SODIUM CHLORIDE, SODIUM LACTATE, POTASSIUM CHLORIDE, AND CALCIUM CHLORIDE 500 ML: .6; .31; .03; .02 INJECTION, SOLUTION INTRAVENOUS at 04:04

## 2018-04-04 RX ADMIN — ASPIRIN 325 MG: 325 TABLET, DELAYED RELEASE ORAL at 08:04
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
## 2018-04-04 NOTE — ASSESSMENT & PLAN NOTE
Vision and Hearing impairment/ Recent Left Hip surgery-  Fall, skin, and aspiration precautions  Consult PT/OT  Check with SNF unit for weight bearing status for LLE
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
## 2018-04-04 NOTE — HPI
This is a 83 y.o. female with a hx of Dementia, DM, GERD, and recent left hip fracture with repair who is currently at [facility]. Patient  presents to the ED via EMS for AMS and lethargy. Daughter notes pt has not been herself for the past few days since her recent hip surgery. Daughter reports patient has been mostly sleeping and has had increased confusion from baseline prompting EMS to be called. Per EMS, pt was unresponsive on the scene and suddenly became responsive en route to the ED. EMS notes a blood glucose of 187 and oxygen saturation of 98% on 2l. 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 Pt does not remember being put into the ambulance. She is 1 week s/p hip surgery. Allergens include Depakote, Penicillins, Adhesive, and Neosporin. 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 Patient evaluated in ER and found to have dehydration with significant anemia. CXR shows signs of possible pneumonia. Daughter reported patient with Hx double pneumonia in January and that she had also recieved a CT placement by  at [facility]. Patient has Hx of dilaudid pain pump just filled in March and she had also been on oxycodone 5mg po q 4 hours prn. Code status discussed with daughter at bedside and currently  patient is a full code. Patient admitted for further evaluation and treatment.

## 2018-04-05 PROBLEM — E43 SEVERE MALNUTRITION: Status: ACTIVE | Noted: 2018-04-05

## 2018-04-05 LAB
ALBUMIN SERPL BCP-MCNC: 2 G/DL
ALP SERPL-CCNC: 77 U/L
ALT SERPL W/O P-5'-P-CCNC: 23 U/L
ANION GAP SERPL CALC-SCNC: 5 MMOL/L
AST SERPL-CCNC: 44 U/L
BASOPHILS # BLD AUTO: 0 K/UL
BASOPHILS NFR BLD: 0.2 %
BILIRUB SERPL-MCNC: 0.2 MG/DL
BUN SERPL-MCNC: 21 MG/DL
CALCIUM SERPL-MCNC: 7.9 MG/DL
CHLORIDE SERPL-SCNC: 104 MMOL/L
CO2 SERPL-SCNC: 30 MMOL/L
CREAT SERPL-MCNC: 1 MG/DL
DIFFERENTIAL METHOD: ABNORMAL
EOSINOPHIL # BLD AUTO: 0.2 K/UL
EOSINOPHIL NFR BLD: 4.3 %
ERYTHROCYTE [DISTWIDTH] IN BLOOD BY AUTOMATED COUNT: 15.6 %
EST. GFR  (AFRICAN AMERICAN): >60 ML/MIN/1.73 M^2
EST. GFR  (NON AFRICAN AMERICAN): 52 ML/MIN/1.73 M^2
ESTIMATED AVG GLUCOSE: 108 MG/DL
GLUCOSE SERPL-MCNC: 98 MG/DL
HBA1C MFR BLD HPLC: 5.4 %
HCT VFR BLD AUTO: 22.3 %
HGB BLD-MCNC: 7.3 G/DL
LYMPHOCYTES # BLD AUTO: 2 K/UL
LYMPHOCYTES NFR BLD: 37.2 %
MAGNESIUM SERPL-MCNC: 2 MG/DL
MCH RBC QN AUTO: 30.8 PG
MCHC RBC AUTO-ENTMCNC: 32.6 G/DL
MCV RBC AUTO: 95 FL
MONOCYTES # BLD AUTO: 0.4 K/UL
MONOCYTES NFR BLD: 8.2 %
NEUTROPHILS # BLD AUTO: 2.7 K/UL
NEUTROPHILS NFR BLD: 50.1 %
PLATELET # BLD AUTO: 299 K/UL
PMV BLD AUTO: 7.9 FL
POCT GLUCOSE: 101 MG/DL (ref 70–110)
POCT GLUCOSE: 109 MG/DL (ref 70–110)
POCT GLUCOSE: 131 MG/DL (ref 70–110)
POCT GLUCOSE: 150 MG/DL (ref 70–110)
POTASSIUM SERPL-SCNC: 4.7 MMOL/L
PROT SERPL-MCNC: 5.4 G/DL
RBC # BLD AUTO: 2.36 M/UL
SODIUM SERPL-SCNC: 139 MMOL/L
WBC # BLD AUTO: 5.4 K/UL

## 2018-04-05 PROCEDURE — 25000242 PHARM REV CODE 250 ALT 637 W/ HCPCS: Performed by: NURSE PRACTITIONER

## 2018-04-05 PROCEDURE — 83735 ASSAY OF MAGNESIUM: CPT

## 2018-04-05 PROCEDURE — 25000003 PHARM REV CODE 250: Performed by: NURSE PRACTITIONER

## 2018-04-05 PROCEDURE — 25000003 PHARM REV CODE 250: Performed by: HOSPITALIST

## 2018-04-05 PROCEDURE — 99226 PR SUBSEQUENT OBSERVATION CARE,LEVEL III: CPT | Mod: ,,, | Performed by: INTERNAL MEDICINE

## 2018-04-05 PROCEDURE — 27000221 HC OXYGEN, UP TO 24 HOURS

## 2018-04-05 PROCEDURE — 94761 N-INVAS EAR/PLS OXIMETRY MLT: CPT

## 2018-04-05 PROCEDURE — 83036 HEMOGLOBIN GLYCOSYLATED A1C: CPT

## 2018-04-05 PROCEDURE — 36415 COLL VENOUS BLD VENIPUNCTURE: CPT

## 2018-04-05 PROCEDURE — G0378 HOSPITAL OBSERVATION PER HR: HCPCS

## 2018-04-05 PROCEDURE — 85025 COMPLETE CBC W/AUTO DIFF WBC: CPT

## 2018-04-05 PROCEDURE — 99900035 HC TECH TIME PER 15 MIN (STAT)

## 2018-04-05 PROCEDURE — 94640 AIRWAY INHALATION TREATMENT: CPT

## 2018-04-05 PROCEDURE — 63600175 PHARM REV CODE 636 W HCPCS: Performed by: INTERNAL MEDICINE

## 2018-04-05 PROCEDURE — 80053 COMPREHEN METABOLIC PANEL: CPT

## 2018-04-05 RX ORDER — ENOXAPARIN SODIUM 100 MG/ML
40 INJECTION SUBCUTANEOUS
Status: DISCONTINUED | OUTPATIENT
Start: 2018-04-05 | End: 2018-04-07 | Stop reason: HOSPADM

## 2018-04-05 RX ORDER — CIPROFLOXACIN 2 MG/ML
400 INJECTION, SOLUTION INTRAVENOUS
Status: DISCONTINUED | OUTPATIENT
Start: 2018-04-05 | End: 2018-04-07 | Stop reason: HOSPADM

## 2018-04-05 RX ORDER — FUROSEMIDE 10 MG/ML
20 INJECTION INTRAMUSCULAR; INTRAVENOUS
Status: DISCONTINUED | OUTPATIENT
Start: 2018-04-05 | End: 2018-04-07 | Stop reason: HOSPADM

## 2018-04-05 RX ORDER — HYDROCODONE BITARTRATE AND ACETAMINOPHEN 500; 5 MG/1; MG/1
TABLET ORAL
Status: DISCONTINUED | OUTPATIENT
Start: 2018-04-05 | End: 2018-04-07 | Stop reason: HOSPADM

## 2018-04-05 RX ADMIN — IPRATROPIUM BROMIDE AND ALBUTEROL SULFATE 3 ML: .5; 3 SOLUTION RESPIRATORY (INHALATION) at 03:04

## 2018-04-05 RX ADMIN — IPRATROPIUM BROMIDE AND ALBUTEROL SULFATE 3 ML: .5; 3 SOLUTION RESPIRATORY (INHALATION) at 11:04

## 2018-04-05 RX ADMIN — FERROUS SULFATE TAB EC 325 MG (65 MG FE EQUIVALENT) 325 MG: 325 (65 FE) TABLET DELAYED RESPONSE at 08:04

## 2018-04-05 RX ADMIN — EZETIMIBE 10 MG: 10 TABLET ORAL at 08:04

## 2018-04-05 RX ADMIN — IPRATROPIUM BROMIDE AND ALBUTEROL SULFATE 3 ML: .5; 3 SOLUTION RESPIRATORY (INHALATION) at 12:04

## 2018-04-05 RX ADMIN — LEVOTHYROXINE SODIUM 25 MCG: 25 TABLET ORAL at 06:04

## 2018-04-05 RX ADMIN — GABAPENTIN 100 MG: 100 CAPSULE ORAL at 08:04

## 2018-04-05 RX ADMIN — FERROUS SULFATE TAB EC 325 MG (65 MG FE EQUIVALENT) 325 MG: 325 (65 FE) TABLET DELAYED RESPONSE at 05:04

## 2018-04-05 RX ADMIN — PANTOPRAZOLE SODIUM 40 MG: 40 TABLET, DELAYED RELEASE ORAL at 08:04

## 2018-04-05 RX ADMIN — OXYCODONE HYDROCHLORIDE AND ACETAMINOPHEN 500 MG: 500 TABLET ORAL at 08:04

## 2018-04-05 RX ADMIN — THERA TABS 1 TABLET: TAB at 08:04

## 2018-04-05 RX ADMIN — ENOXAPARIN SODIUM 40 MG: 100 INJECTION SUBCUTANEOUS at 11:04

## 2018-04-05 RX ADMIN — ASPIRIN 325 MG: 325 TABLET, DELAYED RELEASE ORAL at 08:04

## 2018-04-05 RX ADMIN — LUBIPROSTONE 24 MCG: 8 CAPSULE, GELATIN COATED ORAL at 08:04

## 2018-04-05 RX ADMIN — SIMVASTATIN 40 MG: 10 TABLET, FILM COATED ORAL at 08:04

## 2018-04-05 RX ADMIN — QUETIAPINE FUMARATE 25 MG: 25 TABLET ORAL at 08:04

## 2018-04-05 RX ADMIN — SODIUM CHLORIDE: 0.9 INJECTION, SOLUTION INTRAVENOUS at 07:04

## 2018-04-05 RX ADMIN — IPRATROPIUM BROMIDE AND ALBUTEROL SULFATE 3 ML: .5; 3 SOLUTION RESPIRATORY (INHALATION) at 08:04

## 2018-04-05 RX ADMIN — IPRATROPIUM BROMIDE AND ALBUTEROL SULFATE 3 ML: .5; 3 SOLUTION RESPIRATORY (INHALATION) at 07:04

## 2018-04-05 RX ADMIN — GABAPENTIN 100 MG: 100 CAPSULE ORAL at 05:04

## 2018-04-05 RX ADMIN — CIPROFLOXACIN 400 MG: 2 INJECTION, SOLUTION INTRAVENOUS at 11:04

## 2018-04-05 NOTE — PLAN OF CARE
04/04/18 2042   Patient Assessment/Suction   Level of Consciousness (AVPU) alert   Respiratory Effort Normal;Unlabored   Expansion/Accessory Muscles/Retractions expansion symmetric;no retractions;no use of accessory muscles   All Lung Fields Breath Sounds diminished   Cough Type nonproductive   PRE-TX-O2-ETCO2   O2 Device (Oxygen Therapy) nasal cannula   Flow (L/min) 2   Oxygen Concentration (%) 28   SpO2 98 %   Pulse Oximetry Type Intermittent   Pulse 62   Resp 18   Aerosol Therapy   $ Aerosol Therapy Charges Aerosol Treatment   Respiratory Treatment Status given   SVN/Inhaler Treatment Route mask;with oxygen   Position During Treatment HOB at 45 degrees   Patient Tolerance good   Post-Treatment   Post-treatment Heart Rate (beats/min) 68   Post-treatment Resp Rate (breaths/min) 18   All Fields Breath Sounds aeration increased   Ready to Wean/Extubation Screen   FIO2<=50 (chart decimal) 0.28

## 2018-04-05 NOTE — ASSESSMENT & PLAN NOTE
Related to (etiology):   Decreased ability to consume sufficient energy    Signs and Symptoms (as evidenced by):   1.) Severe muscle loss: prominent protrusion of clavicle bone, squared shoulders, and severe scooping/wasting of temples  2.) Severe fat loss, pronounced hollowness of orbital region, mostly skin in upper arms.       Interventions/Recommendations (treatment strategy):  Request weight check   Continue with diet , send milk TID     Nutrition Diagnosis Status:   New

## 2018-04-05 NOTE — PLAN OF CARE
Problem: Patient Care Overview  Goal: Plan of Care Review  Outcome: Ongoing (interventions implemented as appropriate)  Patient awake and oriented to person and time.  VSS.  Has remained afebrile this shift.  POC reviewed with patient.  Open discussion was facilitated.  Patient verbalized understanding.  Bed in lowest position, side rails up x2, call light within reach, and safety measures maintained throughout shift.  Patient has remained free of falls, trauma, and injury this shift.  Patient denies any needs at this time.  Will continue to monitor.
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
## 2018-04-05 NOTE — PLAN OF CARE
CM met with patient at bedside, patient reports coming form [facility] where she was for skilled nursing and she plans to return there when discharged. Prior to going to [facility], patient lived on her own with assistance form her daughter, Sepideh Lord [phone] who lives next door. Patient uses dme and requires assistance with ADL's. Patient denies POA or living will. PCP is Dr. Chauhan.      04/05/18 1043   Discharge Assessment   Assessment Type Discharge Planning Assessment   Confirmed/corrected address and phone number on facesheet? Yes   Assessment information obtained from? Patient   Communicated expected length of stay with patient/caregiver yes   Prior to hospitilization cognitive status: Alert/Oriented   Prior to hospitalization functional status: Needs Assistance   Current cognitive status: Alert/Oriented   Current Functional Status: Needs Assistance   Lives With alone  (daughter lives next door)   Able to Return to Prior Arrangements unable to determine at this time (comments)   Is patient able to care for self after discharge? Unable to determine at this time (comments)   Patient's perception of discharge disposition skilled nursing facility   Readmission Within The Last 30 Days previous discharge plan unsuccessful   Patient currently being followed by outpatient case management? No   Patient currently receives any other outside agency services? No   Equipment Currently Used at Home cane, straight;walker, rolling;rollator;wheelchair;3-in-1 commode;oxygen   Do you have any problems affording any of your prescribed medications? No   Is the patient taking medications as prescribed? yes   Does the patient have transportation home? Yes   Transportation Available family or friend will provide   Does the patient receive services at the [facility]? No   Discharge Plan A Skilled Nursing Facility   Patient/Family In Agreement With Plan yes

## 2018-04-05 NOTE — PLAN OF CARE
Problem: Patient Care Overview  Goal: Plan of Care Review  Outcome: Ongoing (interventions implemented as appropriate)  Pt alert with periods of confusion. Refusing to receive blood. No new symptoms today. Daughter at bedside. Blood sugars monitored. No insulin needed. Rawls intact. All questions answered. Plan of care reviewed with patient/ family. Will continue to monitor.

## 2018-04-05 NOTE — PLAN OF CARE
04/05/18 0747   Patient Assessment/Suction   Level of Consciousness (AVPU) alert   Respiratory Effort Unlabored   All Lung Fields Breath Sounds diminished   PRE-TX-O2-ETCO2   O2 Device (Oxygen Therapy) nasal cannula   Flow (L/min) 2   Oxygen Concentration (%) 28   SpO2 (!) 94 %   Pulse Oximetry Type Intermittent   Pulse 70   Resp 18   Aerosol Therapy   $ Aerosol Therapy Charges Aerosol Treatment   Respiratory Treatment Status given   SVN/Inhaler Treatment Route mask   Position During Treatment HOB at 45 degrees   Patient Tolerance good   Post-Treatment   Post-treatment Heart Rate (beats/min) 74   Post-treatment Resp Rate (breaths/min) 18   All Fields Breath Sounds unchanged   Ready to Wean/Extubation Screen   FIO2<=50 (chart decimal) 0.28

## 2018-04-05 NOTE — PROGRESS NOTES
Patient arrived to unit via stretcher.  Patient appears to be upset as evidence by yelling, cursing, and aggressive behavior towards family.  Peripheral IV intact.  Rawls cath intact draining clear yellow urine.  VSS.  Environmental stimuli decreased.  Will continue to monitor.

## 2018-04-05 NOTE — PROGRESS NOTES
Progress Note  Hospital Medicine  Patient Name:Shazia Stacy  MRN:  [id number]  Patient Class: IP- Inpatient  Admit Date: 4/4/2018  Length of Stay: 1 days  Expected Discharge Date:   Attending Physician: Josselyn Estes MD  Primary Care Provider:  Nikhil Albrecht MD    SUBJECTIVE:     Principal Problem: Pneumonia  Initial history of present illness: This is a 83 y.o. female with a hx of Dementia, DM, GERD, and recent left hip fracture with repair who is currently at [facility]. Patient  presents to the ED via EMS for AMS and lethargy. Daughter notes pt has not been herself for the past few days since her recent hip surgery. Daughter reports patient has been mostly sleeping and has had increased confusion from baseline prompting EMS to be called. Per EMS, pt was unresponsive on the scene and suddenly became responsive en route to the ED. EMS notes a blood glucose of 187 and oxygen saturation of 98% on 2l. 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 Pt does not remember being put into the ambulance. She is 1 week s/p hip surgery. Allergens include Depakote, Penicillins, Adhesive, and Neosporin. 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 Patient evaluated in ER and found to have dehydration with significant anemia. CXR shows signs of possible pneumonia. Daughter reported patient with Hx double pneumonia in January and that she had also recieved a CT placement by  at [facility]. Patient has Hx of dilaudid pain pump just filled in March and she had also been on oxycodone 5mg po q 4 hours prn. Code status discussed with daughter at bedside and currently  patient is a full code. Patient admitted for further evaluation and treatment.    PMH/PSH/SH/FH/Meds: reviewed.    Symptoms/Review of Systems: Patient is anxious and agitated. Refusing PRBC due to [facility] preferences. Daughter at bedside. No shortness of breath, cough, chest pain or headache, fever or abdominal pain.     Diet:  Adequate intake.    Activity  level: Up with assistance  Pain:  Patient reports no pain.       OBJECTIVE:   Vital Signs (Most Recent):      Temp: 97.2 °F (36.2 °C) (04/05/18 0743)  Pulse: 70 (04/05/18 0747)  Resp: 18 (04/05/18 0747)  BP: 132/73 (04/05/18 0743)  SpO2: (!) 94 % (04/05/18 0747)       Vital Signs Range (Last 24H):  Temp:  [96.2 °F (35.7 °C)-98.5 °F (36.9 °C)]   Pulse:  [58-74]   Resp:  [16-24]   BP: ()/(48-73)   SpO2:  [94 %-100 %]     Weight: 59.5 kg (131 lb 2.8 oz)  Body mass index is 21.83 kg/m².  No intake or output data in the 24 hours ending 04/05/18 1032  Physical Examination:  Constitutional: She appears well-developed. No distress.   Frail elderly   HENT:   Head: Normocephalic and atraumatic.   Deaf  Blind   Eyes: Conjunctivae are normal. Right eye exhibits no discharge. Left eye exhibits no discharge.   Neck: Normal range of motion. Neck supple. No JVD present.   Cardiovascular: Normal rate and intact distal pulses.    Pulmonary/Chest: No stridor. No respiratory distress.   BBS diminished   Abdominal: Soft. Bowel sounds are normal. She exhibits no distension. There is no tenderness. There is no guarding.   Pain pump noted to LLQ    Genitourinary:   Genitourinary Comments: Not examined   Musculoskeletal: She exhibits edema.   BLE 2 plus edema with  LLE> RLE   Neurological: She is alert.   Oriented to person only  Confused  Cooperative   Skin: Skin is warm and dry. Capillary refill takes less than 2 seconds. She is not diaphoretic.   Decreased Skin turgor  Left hip with steristrips- no signs redness or drainage noted  BLE cool to touch but PPP 2 plus     CBC:    Recent Labs  Lab 04/04/18  1637 04/05/18  0542   WBC 5.90 5.40   RBC 2.77* 2.36*   HGB 8.4* 7.3*   HCT 26.3* 22.3*    299   MCV 95 95   MCH 30.4 30.8   MCHC 32.0 32.6   BMP    Recent Labs  Lab 04/04/18  1610 04/05/18  0542 04/05/18  0543   *  --  98     --  139   K 4.9  --  4.7     --  104   CO2 26  --  30*   BUN 24*  --  21    CREATININE 1.1  --  1.0   CALCIUM 8.2*  --  7.9*   MG  --  2.0  --       Diagnostic Results:  Microbiology Results (last 7 days)     ** No results found for the last 168 hours. **         CT Head: No acute intracranial findings.  Findings consistent with involutional change of microvascular ischemic change and old lacunar infarcts.  Old fracture medial wall left orbit.    CXR: Mild patchy right basilar infiltrate, probable trace bilateral pleural effusions.  Findings suggest CHF with pneumonia include in the differential.  Large hiatal or similar from hernia.  Findings as detailed above.    Assessment/Plan:        UTI     Urine C/S. Start IV Cipro.       Symptomatic iron deficiency anemia     Transfuse 2 units of pRBC. Give IV lasix after 2nd units of PRBC - Patient declined and    daughter is aware. Check FOBT.    Hernia, hiatal     GERD-  Continue home PPI         Dementia     Continue home medications         Hematuria     Microscopic  Suspect related to catheterized specimen         Debility     Vision and Hearing impairment/ Recent Left Hip surgery-  Fall, skin, and aspiration precautions  Consult PT/OT  Check with SNF unit for weight bearing status for LLE          Hypotension     Monitor  Continue IVF  Hold any antihypertensives          Moderate Malnutrition  Nutrition consulted. Encourage maximal PO intake. Diet supplementation ordered per nutrition approval. Will encourage PO and monitor closely for weight changes.     Hyperlipidemia associated with type 2 diabetes mellitus     Continue home medication             GERD (gastroesophageal reflux disease)     Continue PPI             Hypothyroidism     Continue home synthroid         Chronic obstructive pulmonary disease     Monitor O2 sats  Supplemental O2 as needed  Add duonebs q 4 hours prn         Chronic low back pain     Chronic pain syndrome-  Pt has dilaudid pain pump- Last filled in March per daughter                    Type 2 diabetes mellitus  with other specified complication     DM Diet  Accuchecks with correctional SSI     Consult PT to evaluate and treat    VTE Risk Mitigation         Ordered     enoxaparin injection 40 mg  Every 24 hours (non-standard times)     Route:  Subcutaneous        04/05/18 1039     Place JEANNE hose  Until discontinued      04/04/18 1858     IP VTE HIGH RISK PATIENT  Once      04/04/18 1857     Place sequential compression device  Until discontinued      04/04/18 1857        Josselyn Estes MD  Department of Hospital Medicine   [facility]

## 2018-04-05 NOTE — PLAN OF CARE
Problem: Patient Care Overview  Goal: Plan of Care Review  Outcome: Ongoing (interventions implemented as appropriate)  Recommendations  Recommendation/Intervention: 1.) Continue with 2000 diabetic diet + boost glucose control TID 2.) Honor food preferences to encourage PO intake (send whole milk TID)  Goals: 1.) patient will consume 50% of meals while admitted  Nutrition Goal Status: new  Communication of RD Recs: other (comment) (sticky note)

## 2018-04-06 VITALS
DIASTOLIC BLOOD PRESSURE: 53 MMHG | RESPIRATION RATE: 17 BRPM | HEART RATE: 58 BPM | TEMPERATURE: 98 F | BODY MASS INDEX: 21.86 KG/M2 | SYSTOLIC BLOOD PRESSURE: 110 MMHG | HEIGHT: 65 IN | WEIGHT: 131.19 LBS | OXYGEN SATURATION: 100 %

## 2018-04-06 PROBLEM — E86.0 DEHYDRATION: Status: ACTIVE | Noted: 2018-04-06

## 2018-04-06 LAB
ALBUMIN SERPL BCP-MCNC: 1.9 G/DL
ALP SERPL-CCNC: 76 U/L
ALT SERPL W/O P-5'-P-CCNC: 20 U/L
ANION GAP SERPL CALC-SCNC: 6 MMOL/L
AST SERPL-CCNC: 32 U/L
BILIRUB SERPL-MCNC: 0.2 MG/DL
BUN SERPL-MCNC: 21 MG/DL
CALCIUM SERPL-MCNC: 7.9 MG/DL
CHLORIDE SERPL-SCNC: 105 MMOL/L
CO2 SERPL-SCNC: 28 MMOL/L
CREAT SERPL-MCNC: 0.9 MG/DL
EST. GFR  (AFRICAN AMERICAN): >60 ML/MIN/1.73 M^2
EST. GFR  (NON AFRICAN AMERICAN): 59 ML/MIN/1.73 M^2
GLUCOSE SERPL-MCNC: 97 MG/DL
POCT GLUCOSE: 255 MG/DL (ref 70–110)
POCT GLUCOSE: 80 MG/DL (ref 70–110)
POTASSIUM SERPL-SCNC: 4.6 MMOL/L
PROT SERPL-MCNC: 5.4 G/DL
SODIUM SERPL-SCNC: 139 MMOL/L

## 2018-04-06 PROCEDURE — 25000003 PHARM REV CODE 250: Performed by: INTERNAL MEDICINE

## 2018-04-06 PROCEDURE — 94640 AIRWAY INHALATION TREATMENT: CPT

## 2018-04-06 PROCEDURE — 25000003 PHARM REV CODE 250: Performed by: NURSE PRACTITIONER

## 2018-04-06 PROCEDURE — 94761 N-INVAS EAR/PLS OXIMETRY MLT: CPT

## 2018-04-06 PROCEDURE — 63600175 PHARM REV CODE 636 W HCPCS: Performed by: INTERNAL MEDICINE

## 2018-04-06 PROCEDURE — 99217 PR OBSERVATION CARE DISCHARGE: CPT | Mod: ,,, | Performed by: INTERNAL MEDICINE

## 2018-04-06 PROCEDURE — 25000003 PHARM REV CODE 250: Performed by: HOSPITALIST

## 2018-04-06 PROCEDURE — 27000221 HC OXYGEN, UP TO 24 HOURS

## 2018-04-06 PROCEDURE — 25000242 PHARM REV CODE 250 ALT 637 W/ HCPCS: Performed by: NURSE PRACTITIONER

## 2018-04-06 PROCEDURE — 36415 COLL VENOUS BLD VENIPUNCTURE: CPT

## 2018-04-06 PROCEDURE — 80053 COMPREHEN METABOLIC PANEL: CPT

## 2018-04-06 PROCEDURE — G0378 HOSPITAL OBSERVATION PER HR: HCPCS

## 2018-04-06 RX ORDER — DOCUSATE SODIUM 100 MG/1
100 CAPSULE, LIQUID FILLED ORAL 2 TIMES DAILY PRN
Refills: 0 | COMMUNITY
Start: 2018-04-06 | End: 2018-09-22 | Stop reason: ALTCHOICE

## 2018-04-06 RX ORDER — CIPROFLOXACIN 250 MG/1
250 TABLET, FILM COATED ORAL 2 TIMES DAILY
Qty: 10 TABLET | Refills: 0
Start: 2018-04-06 | End: 2018-04-11

## 2018-04-06 RX ORDER — BISACODYL 10 MG
10 SUPPOSITORY, RECTAL RECTAL ONCE
Status: COMPLETED | OUTPATIENT
Start: 2018-04-06 | End: 2018-04-06

## 2018-04-06 RX ORDER — IPRATROPIUM BROMIDE AND ALBUTEROL SULFATE 2.5; .5 MG/3ML; MG/3ML
3 SOLUTION RESPIRATORY (INHALATION) EVERY 6 HOURS PRN
Qty: 1 BOX | Refills: 0 | Status: ON HOLD | OUTPATIENT
Start: 2018-04-06 | End: 2018-09-22 | Stop reason: CLARIF

## 2018-04-06 RX ORDER — FERROUS SULFATE 325(65) MG
325 TABLET, DELAYED RELEASE (ENTERIC COATED) ORAL 2 TIMES DAILY WITH MEALS
Refills: 0 | Status: ON HOLD | COMMUNITY
Start: 2018-04-06 | End: 2018-09-22 | Stop reason: CLARIF

## 2018-04-06 RX ORDER — ASPIRIN 325 MG
325 TABLET, DELAYED RELEASE (ENTERIC COATED) ORAL 2 TIMES DAILY
Refills: 0 | COMMUNITY
Start: 2018-04-06 | End: 2018-12-20

## 2018-04-06 RX ORDER — ASCORBIC ACID 500 MG
500 TABLET ORAL NIGHTLY
Status: ON HOLD | COMMUNITY
Start: 2018-04-06 | End: 2018-09-22 | Stop reason: CLARIF

## 2018-04-06 RX ADMIN — IPRATROPIUM BROMIDE AND ALBUTEROL SULFATE 3 ML: .5; 3 SOLUTION RESPIRATORY (INHALATION) at 04:04

## 2018-04-06 RX ADMIN — ASPIRIN 325 MG: 325 TABLET, DELAYED RELEASE ORAL at 08:04

## 2018-04-06 RX ADMIN — OXYCODONE HYDROCHLORIDE AND ACETAMINOPHEN 500 MG: 500 TABLET ORAL at 08:04

## 2018-04-06 RX ADMIN — CIPROFLOXACIN 400 MG: 2 INJECTION, SOLUTION INTRAVENOUS at 10:04

## 2018-04-06 RX ADMIN — BISACODYL 10 MG: 10 SUPPOSITORY RECTAL at 04:04

## 2018-04-06 RX ADMIN — CIPROFLOXACIN 400 MG: 2 INJECTION, SOLUTION INTRAVENOUS at 12:04

## 2018-04-06 RX ADMIN — LUBIPROSTONE 24 MCG: 8 CAPSULE, GELATIN COATED ORAL at 08:04

## 2018-04-06 RX ADMIN — GABAPENTIN 100 MG: 100 CAPSULE ORAL at 08:04

## 2018-04-06 RX ADMIN — FERROUS SULFATE TAB EC 325 MG (65 MG FE EQUIVALENT) 325 MG: 325 (65 FE) TABLET DELAYED RESPONSE at 08:04

## 2018-04-06 RX ADMIN — SIMVASTATIN 40 MG: 10 TABLET, FILM COATED ORAL at 08:04

## 2018-04-06 RX ADMIN — FERROUS SULFATE TAB EC 325 MG (65 MG FE EQUIVALENT) 325 MG: 325 (65 FE) TABLET DELAYED RESPONSE at 04:04

## 2018-04-06 RX ADMIN — LEVOTHYROXINE SODIUM 25 MCG: 25 TABLET ORAL at 06:04

## 2018-04-06 RX ADMIN — QUETIAPINE FUMARATE 25 MG: 25 TABLET ORAL at 08:04

## 2018-04-06 RX ADMIN — IPRATROPIUM BROMIDE AND ALBUTEROL SULFATE 3 ML: .5; 3 SOLUTION RESPIRATORY (INHALATION) at 11:04

## 2018-04-06 RX ADMIN — IPRATROPIUM BROMIDE AND ALBUTEROL SULFATE 3 ML: .5; 3 SOLUTION RESPIRATORY (INHALATION) at 12:04

## 2018-04-06 RX ADMIN — IPRATROPIUM BROMIDE AND ALBUTEROL SULFATE 3 ML: .5; 3 SOLUTION RESPIRATORY (INHALATION) at 03:04

## 2018-04-06 RX ADMIN — THERA TABS 1 TABLET: TAB at 08:04

## 2018-04-06 RX ADMIN — IPRATROPIUM BROMIDE AND ALBUTEROL SULFATE 3 ML: .5; 3 SOLUTION RESPIRATORY (INHALATION) at 07:04

## 2018-04-06 RX ADMIN — GABAPENTIN 100 MG: 100 CAPSULE ORAL at 04:04

## 2018-04-06 RX ADMIN — EZETIMIBE 10 MG: 10 TABLET ORAL at 08:04

## 2018-04-06 RX ADMIN — DOCUSATE CALCIUM 240 MG: 240 CAPSULE, LIQUID FILLED ORAL at 08:04

## 2018-04-06 RX ADMIN — PANTOPRAZOLE SODIUM 40 MG: 40 TABLET, DELAYED RELEASE ORAL at 08:04

## 2018-04-06 NOTE — NURSING
Pt given discharge instructions written and verbal. IV removed. Tele removed. Awaiting [facility] transport.

## 2018-04-06 NOTE — PLAN OF CARE
Problem: Patient Care Overview  Goal: Plan of Care Review  Outcome: Ongoing (interventions implemented as appropriate)  Patient AAOx4.  VSS.  Has remained afebrile this shift.  Patient continues to refuse blood products for personal beliefs.  IVF and IV abx infusing per orders.  Patient rested peacefully majority of shift.  Hourly rounding completed per unit's standards.  POC reviewed with patient.  Open discussion was facilitated.  Patient verbalized understanding.  Bed in lowest position, side rails up x2, call light within reach, and safety measures maintained throughout shift.  Patient has remained free of falls, trauma, and injury this shift.  Patient denies any needs at this time.  Will continue to monitor.

## 2018-04-06 NOTE — DISCHARGE INSTRUCTIONS
Admit to [facility]    Dx-   Patient Active Problem List   Diagnosis    Small bowel obstruction    Crohn's disease without complication    Type 2 diabetes mellitus with other specified complication    Normocytic anemia    Chronic low back pain    Leg pain    Tobacco dependence in remission    Intractable headache    Varicose veins of lower extremities with other complications    Left lower quadrant pain    Bleeding gastrointestinal    Diarrhea    Chest pain    Shortness of breath    Chronic obstructive pulmonary disease    Non-insulin dependent type 2 diabetes mellitus    Hypothyroidism    GERD (gastroesophageal reflux disease)    Hyperlipidemia associated with type 2 diabetes mellitus    Left leg cellulitis    Cellulitis of left lower extremity    KALA (acute kidney injury)    Hyperkalemia    Hypotension    Debility    Hematuria    Dementia    Hernia, hiatal    Status post hip surgery    Chronic pain syndrome    Pneumonia    Severe malnutrition     Diet- 1800 ADA    Accu checks AC and HS with SSI-   **LOW CORRECTION DOSE**   Blood Glucose   mg/dL                  Pre-meal                Bedtime   151-200                0 unit                      0 unit   201-250                2 units                    1 unit   251-300                3 units                    1 unit   301-350                4 units                    2 units   >350                     5 units                    3 units       PT/OT eval and treat 5xs weekly; WBAT to LLE  Hip Precautions   Fall Precautions   Turn q2 while in bed  Up to chair with all meals

## 2018-04-06 NOTE — PLAN OF CARE
Problem: Patient Care Overview  Goal: Plan of Care Review  Outcome: Ongoing (interventions implemented as appropriate)  Pt on 2L NC with Q4 duoneb

## 2018-04-06 NOTE — PT/OT/SLP PROGRESS
Physical Therapy      Patient Name:  Shazia Stacy   MRN:  [id number]    Patient not seen today secondary to  (PT eval attempted. pt refusing- stated going back to [facility]. nurse jarrell made aware)..    Annel Medina, PT

## 2018-04-06 NOTE — PROGRESS NOTES
04/06/18 0407   Patient Assessment/Suction   Level of Consciousness (AVPU) alert   Respiratory Effort Unlabored   Expansion/Accessory Muscles/Retractions no use of accessory muscles;no retractions   All Lung Fields Breath Sounds clear   Cough Type nonproductive   PRE-TX-O2-ETCO2   O2 Device (Oxygen Therapy) nasal cannula   $ Is the patient on Low Flow Oxygen? Yes   Flow (L/min) 2   SpO2 100 %   Pulse 60   Resp 16   Aerosol Therapy   $ Aerosol Therapy Charges Aerosol Treatment   Respiratory Treatment Status given   SVN/Inhaler Treatment Route mask   Position During Treatment HOB at 30 degrees   Patient Tolerance good   Post-Treatment   Post-treatment Heart Rate (beats/min) 60   Post-treatment Resp Rate (breaths/min) 18   All Fields Breath Sounds aeration increased

## 2018-04-06 NOTE — DISCHARGE SUMMARY
Discharge Summary  Hospital Medicine    Admit Date: 4/4/2018    Date and Time: 4/6/20181:27 PM    Discharge Attending Physician: Josselyn Estes MD    Primary Care Physician: Nikhil Albrecht MD    Diagnoses:  Active Hospital Problems    Diagnosis  POA    *UTI  Yes         Dehydration [E86.0]  Unknown    Severe malnutrition [E43]  Yes    Hypotension [I95.9]  Yes    Debility [R53.81]  Yes    Hematuria [R31.9]  Yes    Dementia [F03.90]  Yes    Hernia, hiatal [K44.9]  Yes     Suspected      Status post hip surgery [Z98.890]  Not Applicable    Chronic pain syndrome [G89.4]  Yes    GERD (gastroesophageal reflux disease) [K21.9]  Yes    Hyperlipidemia associated with type 2 diabetes mellitus [E11.69, E78.5]  Yes    Hypothyroidism [E03.9]  Yes    Chronic obstructive pulmonary disease [J44.9]  Yes    Chronic low back pain [M54.5, G89.29]  Yes    Type 2 diabetes mellitus with other specified complication [E11.69]  Yes    Anemia [D64.9]  Yes     Discharged Condition: Good    Hospital Course:   This is a 83 y.o. female with a hx of Dementia, DM, GERD, and recent left hip fracture with repair who is currently at [facility]. Patient  presented to the ED via EMS for AMS and lethargy. Daughter noted pt had not been herself for the past few days since her recent hip surgery. Daughter reported patient had been mostly sleeping and has had increased confusion from baseline prompting EMS to be called. Per EMS, pt was unresponsive on the scene and suddenly became responsive en route to the ED. EMS noted a blood glucose of 187 and oxygen saturation of 98% on 2l. 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 Pt did not remember being put into the ambulance. She is 1 week s/p hip surgery. Allergens include Depakote, Penicillins, Adhesive, and Neosporin. 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 Patient evaluated in ER and found to have dehydration with significant anemia. CXR shows signs of possible  pneumonia. Daughter reported patient with Hx double pneumonia in January and that she had also recieved a CT placement by  at [facility]. Patient has Hx of dilaudid pain pump just filled in March and she had also been on oxycodone 5mg po q 4 hours prn. Code status discussed with daughter at bedside and currently patient is a full code. Patient admitted for further evaluation and treatment. Patient was admitted to Hospitalist medicine service. Patient is being treated for UTI with antibiotics. Patient has refused PRBC for symptomatic anemia due to Congregational reasons. Patient wants to go back immediately back to SNF. Patient was discharged to SNF in stable condition with following discharge plan of care.     Consults: None    Significant Diagnostic Studies:   CT Head: No acute intracranial findings.  Findings consistent with involutional change of microvascular ischemic change and old lacunar infarcts.  Old fracture medial wall left orbit.     CXR: Mild patchy right basilar infiltrate, probable trace bilateral pleural effusions.  Findings suggest CHF with pneumonia include in the differential.  Large hiatal or similar from hernia.  Findings as detailed above.    KUB: Constipation.  Prior kyphoplasty of L3.  Mild lumbar scoliosis.  Prior left hip replacement.  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    Microbiology Results (last 7 days)     Procedure Component Value Units Date/Time    Urine culture [[id number]] Collected:  04/04/18 1849    Order Status:  Sent Specimen:  Urine Updated:  04/05/18 1918    Urine culture [[id number]]     Order Status:  Canceled Specimen:  Urine         Special Treatments/Procedures: None  Disposition: SNF  Medications:  Reconciled Home Medications: Current Discharge Medication List      START taking these medications    Details   albuterol-ipratropium 2.5mg-0.5mg/3mL (DUO-NEB) 0.5 mg-3 mg(2.5 mg base)/3 mL nebulizer solution Take 3 mLs by nebulization every 6 (six) hours as needed for  Wheezing. Rescue  Qty: 1 Box, Refills: 0      ascorbic acid, vitamin C, (VITAMIN C) 500 MG tablet Take 1 tablet (500 mg total) by mouth every evening.      ciprofloxacin HCl (CIPRO) 250 MG tablet Take 1 tablet (250 mg total) by mouth 2 (two) times daily.  Qty: 10 tablet, Refills: 0      docusate sodium (COLACE) 100 MG capsule Take 1 capsule (100 mg total) by mouth 2 (two) times daily as needed for Constipation.  Refills: 0      ferrous sulfate 325 (65 FE) MG EC tablet Take 1 tablet (325 mg total) by mouth 2 (two) times daily with meals.  Refills: 0      multivitamin (THERAGRAN) tablet Take 1 tablet by mouth once daily.         CONTINUE these medications which have CHANGED    Details   aspirin (ECOTRIN) 325 MG EC tablet Take 1 tablet (325 mg total) by mouth 2 (two) times daily.  Refills: 0         CONTINUE these medications which have NOT CHANGED    Details   docusate calcium (JAZZY-TIN, DOCUSATE CALCIUM,) 240 mg capsule Take 240 mg by mouth once daily.      enalapril (VASOTEC) 5 MG tablet Take 5 mg by mouth once daily.       fluticasone-vilanterol (BREO ELLIPTA) 100-25 mcg/dose diskus inhaler Inhale 1 puff into the lungs once daily.      gabapentin (NEURONTIN) 300 MG capsule Take 300 mg by mouth every evening.       levothyroxine (SYNTHROID) 25 MCG tablet Take 25 mcg by mouth before breakfast.       lubiprostone (AMITIZA) 24 MCG Cap Take 24 mcg by mouth every evening.       ondansetron (ZOFRAN-ODT) 8 MG TbDL Take 8 mg by mouth every 12 (twelve) hours as needed (nausea / vomiting).      oxyCODONE (ROXICODONE) 5 MG immediate release tablet Take 5 mg by mouth every 4 (four) hours as needed for Pain.      pantoprazole (PROTONIX) 40 MG tablet Take 40 mg by mouth once daily.       quetiapine (SEROQUEL XR) 200 MG Tb24 Take 200 mg by mouth every evening.       simvastatin (ZOCOR) 40 MG tablet Take 40 mg by mouth every evening.       temazepam (RESTORIL) 30 mg capsule Take 30 mg by mouth every evening.       ZETIA 10 mg tablet  Take 10 mg by mouth once daily.              Discharge Procedure Orders  Diet diabetic     Diet Cardiac   Scheduling Instructions: Boost can with meals     Activity as tolerated   Order Comments: Observe fall precautions.     Call MD for:   Order Comments: For worsening symptoms, chest pain, shortness of breath, increased abdominal pain, high grade fever, stroke or stroke like symptoms, immediately go to the nearest Emergency Room or call 911 as soon as possible.       Follow-up Information     Nikhil Albrecht MD In 1 week.    Specialty:  Internal Medicine  Contact information:  [facility] [address] [place] [address] [id number] [phone]

## 2018-04-06 NOTE — PLAN OF CARE
04/06/18 1415   Discharge Assessment   Assessment Type Discharge Planning Reassessment   CM called [facility] to inform that patient will return today. N auth # [id number], CM sent referral via [facility]. [facility] will call back with report information. Patient will require ambulance transportation. [facility] can not transport oxygen.     245pm DARRICK informed [facility] that patient will not require oxygen, sats on room air are 90-91%. They will transport patient.     315pm CM received call from [facility] regarding patient  They are requesting KUB. KUB ordered and Hayes in radiology notified to do stat. CM informed floor nurseCarmina.    400pm CM informed Xiomara @ [facility] that KUB shows constipation, patient iwll get laxative. CM informed floor nursecarmina to call [facility] once patient has BM . Call report to Nicolette @ [phone] . Patient will need ambulance, [facility] does not have transport after 400pm. [facility], [facility],  stated that they are aware that they will pay for transportation cost.

## 2018-04-06 NOTE — NURSING
"Pt blood sugar 255, pt refused insulin. Pt stated "my sugar is never this high, I just ate a lot today. I do not want it here, I dont take it at home." Educated patient, pt still refusing.       Pt refusing lovenox, Pt states "i will take it tomorrow, but I just dont want it today." Educated pt that it prevents blood clots and she stated "i know".   "

## 2018-04-07 LAB — BACTERIA UR CULT: NORMAL

## 2018-04-07 NOTE — PLAN OF CARE
04/07/18 1128   Final Note   Assessment Type Final Discharge Note   Discharge Disposition SNF

## 2018-04-07 NOTE — PROGRESS NOTES
04/06/18 1907   Patient Assessment/Suction   Level of Consciousness (AVPU) alert   Respiratory Effort Unlabored   Expansion/Accessory Muscles/Retractions no use of accessory muscles;no retractions   All Lung Fields Breath Sounds diminished   Rhythm/Pattern, Respiratory no shortness of breath reported   Cough Type nonproductive   PRE-TX-O2-ETCO2   O2 Device (Oxygen Therapy) nasal cannula   $ Is the patient on Low Flow Oxygen? Yes   Flow (L/min) 2   SpO2 100 %   Pulse Oximetry Type Intermittent   $ Pulse Oximetry - Multiple Charge Pulse Oximetry - Multiple   Pulse (!) 58   Resp 17   Aerosol Therapy   $ Aerosol Therapy Charges Aerosol Treatment   Respiratory Treatment Status given   SVN/Inhaler Treatment Route mask   Position During Treatment HOB at 30 degrees   Patient Tolerance good   Post-Treatment   Post-treatment Heart Rate (beats/min) 60   Post-treatment Resp Rate (breaths/min) 18   All Fields Breath Sounds unchanged

## 2018-04-08 LAB
BLD PROD TYP BPU: NORMAL
BLD PROD TYP BPU: NORMAL
BLOOD UNIT EXPIRATION DATE: NORMAL
BLOOD UNIT EXPIRATION DATE: NORMAL
BLOOD UNIT TYPE CODE: 6200
BLOOD UNIT TYPE CODE: 6200
BLOOD UNIT TYPE: NORMAL
BLOOD UNIT TYPE: NORMAL
CODING SYSTEM: NORMAL
CODING SYSTEM: NORMAL
DISPENSE STATUS: NORMAL
DISPENSE STATUS: NORMAL
NUM UNITS TRANS PACKED RBC: NORMAL
NUM UNITS TRANS PACKED RBC: NORMAL

## 2018-04-17 ENCOUNTER — OFFICE VISIT (OUTPATIENT)
Dept: ORTHOPEDICS | Facility: CLINIC | Age: 83
End: 2018-04-17
Payer: MEDICARE

## 2018-04-17 DIAGNOSIS — Z96.642 HISTORY OF TOTAL HIP ARTHROPLASTY, LEFT: ICD-10-CM

## 2018-04-17 DIAGNOSIS — Z98.890 STATUS POST HIP SURGERY: Primary | ICD-10-CM

## 2018-04-17 DIAGNOSIS — M16.12 PRIMARY OSTEOARTHRITIS OF LEFT HIP: ICD-10-CM

## 2018-04-17 DIAGNOSIS — S72.002D CLOSED FRACTURE OF NECK OF LEFT FEMUR WITH ROUTINE HEALING: ICD-10-CM

## 2018-04-17 PROCEDURE — 99024 POSTOP FOLLOW-UP VISIT: CPT | Mod: ,,, | Performed by: ORTHOPAEDIC SURGERY

## 2018-04-17 PROCEDURE — 72170 X-RAY EXAM OF PELVIS: CPT | Mod: ,,, | Performed by: ORTHOPAEDIC SURGERY

## 2018-05-31 ENCOUNTER — OFFICE VISIT (OUTPATIENT)
Dept: ORTHOPEDICS | Facility: CLINIC | Age: 83
End: 2018-05-31
Payer: MEDICARE

## 2018-05-31 VITALS
SYSTOLIC BLOOD PRESSURE: 131 MMHG | DIASTOLIC BLOOD PRESSURE: 58 MMHG | WEIGHT: 102 LBS | HEART RATE: 57 BPM | BODY MASS INDEX: 16.39 KG/M2 | HEIGHT: 66 IN

## 2018-05-31 DIAGNOSIS — Z96.642 HISTORY OF TOTAL HIP ARTHROPLASTY, LEFT: Primary | ICD-10-CM

## 2018-05-31 PROCEDURE — 99024 POSTOP FOLLOW-UP VISIT: CPT | Mod: ,,, | Performed by: ORTHOPAEDIC SURGERY
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
## 2018-08-30 ENCOUNTER — OFFICE VISIT (OUTPATIENT)
Dept: ORTHOPEDICS | Facility: CLINIC | Age: 83
End: 2018-08-30
Payer: MEDICARE

## 2018-08-30 VITALS
HEIGHT: 65 IN | BODY MASS INDEX: 17 KG/M2 | DIASTOLIC BLOOD PRESSURE: 68 MMHG | HEART RATE: 70 BPM | SYSTOLIC BLOOD PRESSURE: 110 MMHG | WEIGHT: 102 LBS

## 2018-08-30 DIAGNOSIS — Z96.642 HISTORY OF TOTAL HIP ARTHROPLASTY, LEFT: Primary | ICD-10-CM

## 2018-08-30 PROCEDURE — 99212 OFFICE O/P EST SF 10 MIN: CPT | Mod: ,,, | Performed by: ORTHOPAEDIC SURGERY

## 2018-08-30 PROCEDURE — 3078F DIAST BP <80 MM HG: CPT | Mod: ,,, | Performed by: ORTHOPAEDIC SURGERY

## 2018-08-30 PROCEDURE — 3074F SYST BP LT 130 MM HG: CPT | Mod: ,,, | Performed by: ORTHOPAEDIC SURGERY

## 2018-08-30 PROCEDURE — 72170 X-RAY EXAM OF PELVIS: CPT | Mod: ,,, | Performed by: ORTHOPAEDIC SURGERY

## 2018-08-30 RX ORDER — NEOMYCIN SULFATE, POLYMYXIN B SULFATE, AND DEXAMETHASONE 3.5; 10000; 1 MG/G; [USP'U]/G; MG/G
OINTMENT OPHTHALMIC
Refills: 6 | Status: ON HOLD | COMMUNITY
Start: 2018-08-28 | End: 2018-09-22 | Stop reason: CLARIF

## 2018-08-30 RX ORDER — HYDROMORPHONE HYDROCHLORIDE 4 MG/1
TABLET ORAL
Refills: 0 | COMMUNITY
Start: 2018-07-30 | End: 2018-12-20

## 2018-08-30 RX ORDER — LORAZEPAM 1 MG/1
1 TABLET ORAL NIGHTLY PRN
Refills: 2 | COMMUNITY
Start: 2018-08-09

## 2018-08-30 RX ORDER — CERTOLIZUMAB PEGOL 200 MG/ML
400 INJECTION, SOLUTION SUBCUTANEOUS
COMMUNITY
Start: 2018-08-09
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
## 2018-09-21 ENCOUNTER — HOSPITAL ENCOUNTER (INPATIENT)
Facility: HOSPITAL | Age: 83
LOS: 5 days | Discharge: HOME | DRG: 388 | End: 2018-09-26
Attending: EMERGENCY MEDICINE | Admitting: INTERNAL MEDICINE
Payer: MEDICARE

## 2018-09-21 DIAGNOSIS — I48.91 ATRIAL FIBRILLATION: ICD-10-CM

## 2018-09-21 DIAGNOSIS — E03.9 HYPOTHYROIDISM, UNSPECIFIED TYPE: ICD-10-CM

## 2018-09-21 DIAGNOSIS — K56.609 SMALL BOWEL OBSTRUCTION: ICD-10-CM

## 2018-09-21 DIAGNOSIS — K56.609 SBO (SMALL BOWEL OBSTRUCTION): Primary | ICD-10-CM

## 2018-09-21 DIAGNOSIS — R53.81 DEBILITY: ICD-10-CM

## 2018-09-21 DIAGNOSIS — J44.9 CHRONIC OBSTRUCTIVE PULMONARY DISEASE, UNSPECIFIED COPD TYPE: ICD-10-CM

## 2018-09-21 DIAGNOSIS — Z46.59 ENCOUNTER FOR NASOGASTRIC (NG) TUBE PLACEMENT: ICD-10-CM

## 2018-09-21 DIAGNOSIS — E43 SEVERE MALNUTRITION: ICD-10-CM

## 2018-09-21 DIAGNOSIS — R00.0 TACHYCARDIA: ICD-10-CM

## 2018-09-21 PROCEDURE — P9612 CATHETERIZE FOR URINE SPEC: HCPCS

## 2018-09-21 PROCEDURE — 99285 EMERGENCY DEPT VISIT HI MDM: CPT | Mod: 25

## 2018-09-21 PROCEDURE — 12000002 HC ACUTE/MED SURGE SEMI-PRIVATE ROOM

## 2018-09-21 RX ORDER — DIPHENHYDRAMINE HYDROCHLORIDE 50 MG/ML
12.5 INJECTION INTRAMUSCULAR; INTRAVENOUS
Status: COMPLETED | OUTPATIENT
Start: 2018-09-21 | End: 2018-09-22

## 2018-09-21 RX ORDER — ONDANSETRON 2 MG/ML
4 INJECTION INTRAMUSCULAR; INTRAVENOUS
Status: COMPLETED | OUTPATIENT
Start: 2018-09-21 | End: 2018-09-22

## 2018-09-22 PROBLEM — J44.1 COPD EXACERBATION: Status: ACTIVE | Noted: 2018-09-22

## 2018-09-22 PROBLEM — F11.20 OPIOID DEPENDENCE: Status: ACTIVE | Noted: 2018-09-22

## 2018-09-22 PROBLEM — J44.9 COPD (CHRONIC OBSTRUCTIVE PULMONARY DISEASE): Status: ACTIVE | Noted: 2018-09-22

## 2018-09-22 PROBLEM — K56.609 SBO (SMALL BOWEL OBSTRUCTION): Status: ACTIVE | Noted: 2018-09-22

## 2018-09-22 LAB
ALBUMIN SERPL BCP-MCNC: 3.4 G/DL
ALBUMIN SERPL BCP-MCNC: 3.4 G/DL
ALP SERPL-CCNC: 113 U/L
ALP SERPL-CCNC: 116 U/L
ALT SERPL W/O P-5'-P-CCNC: 14 U/L
ALT SERPL W/O P-5'-P-CCNC: 15 U/L
ANION GAP SERPL CALC-SCNC: 8 MMOL/L
ANION GAP SERPL CALC-SCNC: 8 MMOL/L
AST SERPL-CCNC: 22 U/L
AST SERPL-CCNC: 24 U/L
BASOPHILS # BLD AUTO: 0 K/UL
BASOPHILS # BLD AUTO: 0 K/UL
BASOPHILS NFR BLD: 0.1 %
BASOPHILS NFR BLD: 0.2 %
BILIRUB SERPL-MCNC: 0.3 MG/DL
BILIRUB SERPL-MCNC: 0.5 MG/DL
BILIRUB UR QL STRIP: NEGATIVE
BILIRUB UR QL STRIP: NEGATIVE
BUN SERPL-MCNC: 19 MG/DL
BUN SERPL-MCNC: 20 MG/DL
CALCIUM SERPL-MCNC: 8.3 MG/DL
CALCIUM SERPL-MCNC: 8.5 MG/DL
CHLORIDE SERPL-SCNC: 105 MMOL/L
CHLORIDE SERPL-SCNC: 106 MMOL/L
CLARITY UR: CLEAR
CLARITY UR: CLEAR
CO2 SERPL-SCNC: 26 MMOL/L
CO2 SERPL-SCNC: 28 MMOL/L
COLOR UR: YELLOW
COLOR UR: YELLOW
CREAT SERPL-MCNC: 0.9 MG/DL
CREAT SERPL-MCNC: 0.9 MG/DL
DIFFERENTIAL METHOD: ABNORMAL
DIFFERENTIAL METHOD: ABNORMAL
EOSINOPHIL # BLD AUTO: 0 K/UL
EOSINOPHIL # BLD AUTO: 0 K/UL
EOSINOPHIL NFR BLD: 0.5 %
EOSINOPHIL NFR BLD: 0.6 %
ERYTHROCYTE [DISTWIDTH] IN BLOOD BY AUTOMATED COUNT: 15 %
ERYTHROCYTE [DISTWIDTH] IN BLOOD BY AUTOMATED COUNT: 15.7 %
EST. GFR  (AFRICAN AMERICAN): >60 ML/MIN/1.73 M^2
EST. GFR  (AFRICAN AMERICAN): >60 ML/MIN/1.73 M^2
EST. GFR  (NON AFRICAN AMERICAN): 59 ML/MIN/1.73 M^2
EST. GFR  (NON AFRICAN AMERICAN): 59 ML/MIN/1.73 M^2
ESTIMATED AVG GLUCOSE: 126 MG/DL
GLUCOSE SERPL-MCNC: 128 MG/DL
GLUCOSE SERPL-MCNC: 128 MG/DL
GLUCOSE UR QL STRIP: NEGATIVE
GLUCOSE UR QL STRIP: NEGATIVE
HBA1C MFR BLD HPLC: 6 %
HCT VFR BLD AUTO: 34.1 %
HCT VFR BLD AUTO: 36.5 %
HGB BLD-MCNC: 11.5 G/DL
HGB BLD-MCNC: 11.9 G/DL
HGB UR QL STRIP: NEGATIVE
HGB UR QL STRIP: NEGATIVE
KETONES UR QL STRIP: NEGATIVE
KETONES UR QL STRIP: NEGATIVE
LEUKOCYTE ESTERASE UR QL STRIP: NEGATIVE
LEUKOCYTE ESTERASE UR QL STRIP: NEGATIVE
LIPASE SERPL-CCNC: 8 U/L
LYMPHOCYTES # BLD AUTO: 1.2 K/UL
LYMPHOCYTES # BLD AUTO: 1.5 K/UL
LYMPHOCYTES NFR BLD: 16.8 %
LYMPHOCYTES NFR BLD: 17 %
MAGNESIUM SERPL-MCNC: 2.2 MG/DL
MCH RBC QN AUTO: 30.2 PG
MCH RBC QN AUTO: 31 PG
MCHC RBC AUTO-ENTMCNC: 32.6 G/DL
MCHC RBC AUTO-ENTMCNC: 33.8 G/DL
MCV RBC AUTO: 92 FL
MCV RBC AUTO: 93 FL
MONOCYTES # BLD AUTO: 0.1 K/UL
MONOCYTES # BLD AUTO: 0.6 K/UL
MONOCYTES NFR BLD: 1.4 %
MONOCYTES NFR BLD: 7.1 %
NEUTROPHILS # BLD AUTO: 6 K/UL
NEUTROPHILS # BLD AUTO: 6.5 K/UL
NEUTROPHILS NFR BLD: 75.3 %
NEUTROPHILS NFR BLD: 81 %
NITRITE UR QL STRIP: NEGATIVE
NITRITE UR QL STRIP: NEGATIVE
PH UR STRIP: 7 [PH] (ref 5–8)
PH UR STRIP: 7 [PH] (ref 5–8)
PHOSPHATE SERPL-MCNC: 3.8 MG/DL
PLATELET # BLD AUTO: 203 K/UL
PLATELET # BLD AUTO: 219 K/UL
PMV BLD AUTO: 8.3 FL
PMV BLD AUTO: 8.7 FL
POCT GLUCOSE: 121 MG/DL (ref 70–110)
POCT GLUCOSE: 140 MG/DL (ref 70–110)
POTASSIUM SERPL-SCNC: 4.1 MMOL/L
POTASSIUM SERPL-SCNC: 4.3 MMOL/L
PROT SERPL-MCNC: 7.1 G/DL
PROT SERPL-MCNC: 7.2 G/DL
PROT UR QL STRIP: ABNORMAL
PROT UR QL STRIP: ABNORMAL
RBC # BLD AUTO: 3.72 M/UL
RBC # BLD AUTO: 3.94 M/UL
SODIUM SERPL-SCNC: 140 MMOL/L
SODIUM SERPL-SCNC: 141 MMOL/L
SP GR UR STRIP: 1.01 (ref 1–1.03)
SP GR UR STRIP: 1.01 (ref 1–1.03)
URN SPEC COLLECT METH UR: ABNORMAL
URN SPEC COLLECT METH UR: ABNORMAL
UROBILINOGEN UR STRIP-ACNC: NEGATIVE EU/DL
UROBILINOGEN UR STRIP-ACNC: NEGATIVE EU/DL
WBC # BLD AUTO: 7.3 K/UL
WBC # BLD AUTO: 8.6 K/UL

## 2018-09-22 PROCEDURE — 63600175 PHARM REV CODE 636 W HCPCS: Performed by: NURSE PRACTITIONER

## 2018-09-22 PROCEDURE — 85025 COMPLETE CBC W/AUTO DIFF WBC: CPT

## 2018-09-22 PROCEDURE — 80053 COMPREHEN METABOLIC PANEL: CPT

## 2018-09-22 PROCEDURE — 83690 ASSAY OF LIPASE: CPT

## 2018-09-22 PROCEDURE — 27000221 HC OXYGEN, UP TO 24 HOURS

## 2018-09-22 PROCEDURE — 99223 1ST HOSP IP/OBS HIGH 75: CPT | Mod: ,,, | Performed by: SURGERY

## 2018-09-22 PROCEDURE — 96374 THER/PROPH/DIAG INJ IV PUSH: CPT

## 2018-09-22 PROCEDURE — 80053 COMPREHEN METABOLIC PANEL: CPT | Mod: 91

## 2018-09-22 PROCEDURE — 96375 TX/PRO/DX INJ NEW DRUG ADDON: CPT

## 2018-09-22 PROCEDURE — 63600175 PHARM REV CODE 636 W HCPCS: Performed by: INTERNAL MEDICINE

## 2018-09-22 PROCEDURE — 25500020 PHARM REV CODE 255

## 2018-09-22 PROCEDURE — 83735 ASSAY OF MAGNESIUM: CPT

## 2018-09-22 PROCEDURE — 25000003 PHARM REV CODE 250: Performed by: EMERGENCY MEDICINE

## 2018-09-22 PROCEDURE — 63600175 PHARM REV CODE 636 W HCPCS: Performed by: EMERGENCY MEDICINE

## 2018-09-22 PROCEDURE — 99900035 HC TECH TIME PER 15 MIN (STAT)

## 2018-09-22 PROCEDURE — 25000003 PHARM REV CODE 250: Performed by: NURSE PRACTITIONER

## 2018-09-22 PROCEDURE — 25000003 PHARM REV CODE 250: Performed by: INTERNAL MEDICINE

## 2018-09-22 PROCEDURE — 84100 ASSAY OF PHOSPHORUS: CPT

## 2018-09-22 PROCEDURE — 36415 COLL VENOUS BLD VENIPUNCTURE: CPT

## 2018-09-22 PROCEDURE — 12000002 HC ACUTE/MED SURGE SEMI-PRIVATE ROOM

## 2018-09-22 PROCEDURE — 83036 HEMOGLOBIN GLYCOSYLATED A1C: CPT

## 2018-09-22 PROCEDURE — 81003 URINALYSIS AUTO W/O SCOPE: CPT

## 2018-09-22 PROCEDURE — 94761 N-INVAS EAR/PLS OXIMETRY MLT: CPT

## 2018-09-22 RX ORDER — HYDROMORPHONE HYDROCHLORIDE 2 MG/ML
0.5 INJECTION, SOLUTION INTRAMUSCULAR; INTRAVENOUS; SUBCUTANEOUS EVERY 4 HOURS PRN
Status: DISCONTINUED | OUTPATIENT
Start: 2018-09-22 | End: 2018-09-26 | Stop reason: HOSPADM

## 2018-09-22 RX ORDER — IBUPROFEN 200 MG
16 TABLET ORAL
Status: DISCONTINUED | OUTPATIENT
Start: 2018-09-22 | End: 2018-09-26 | Stop reason: HOSPADM

## 2018-09-22 RX ORDER — ENOXAPARIN SODIUM 100 MG/ML
40 INJECTION SUBCUTANEOUS EVERY 24 HOURS
Status: DISCONTINUED | OUTPATIENT
Start: 2018-09-22 | End: 2018-09-26 | Stop reason: HOSPADM

## 2018-09-22 RX ORDER — HYDROMORPHONE HYDROCHLORIDE 2 MG/ML
0.5 INJECTION, SOLUTION INTRAMUSCULAR; INTRAVENOUS; SUBCUTANEOUS EVERY 6 HOURS PRN
Status: DISCONTINUED | OUTPATIENT
Start: 2018-09-22 | End: 2018-09-22

## 2018-09-22 RX ORDER — HYDRALAZINE HYDROCHLORIDE 25 MG/1
25 TABLET, FILM COATED ORAL EVERY 8 HOURS
Status: DISCONTINUED | OUTPATIENT
Start: 2018-09-22 | End: 2018-09-22

## 2018-09-22 RX ORDER — LABETALOL HYDROCHLORIDE 5 MG/ML
10 INJECTION, SOLUTION INTRAVENOUS ONCE
Status: DISCONTINUED | OUTPATIENT
Start: 2018-09-22 | End: 2018-09-25

## 2018-09-22 RX ORDER — IBUPROFEN 200 MG
24 TABLET ORAL
Status: DISCONTINUED | OUTPATIENT
Start: 2018-09-22 | End: 2018-09-26 | Stop reason: HOSPADM

## 2018-09-22 RX ORDER — ACETAMINOPHEN 325 MG/1
650 TABLET ORAL EVERY 4 HOURS PRN
Status: DISCONTINUED | OUTPATIENT
Start: 2018-09-22 | End: 2018-09-26 | Stop reason: HOSPADM

## 2018-09-22 RX ORDER — SODIUM CHLORIDE 9 MG/ML
INJECTION, SOLUTION INTRAVENOUS CONTINUOUS
Status: DISCONTINUED | OUTPATIENT
Start: 2018-09-22 | End: 2018-09-26 | Stop reason: HOSPADM

## 2018-09-22 RX ORDER — LEVOTHYROXINE SODIUM 25 UG/1
25 TABLET ORAL
Status: DISCONTINUED | OUTPATIENT
Start: 2018-09-22 | End: 2018-09-23

## 2018-09-22 RX ORDER — FLUTICASONE FUROATE AND VILANTEROL 100; 25 UG/1; UG/1
1 POWDER RESPIRATORY (INHALATION) DAILY
Status: DISCONTINUED | OUTPATIENT
Start: 2018-09-22 | End: 2018-09-26 | Stop reason: HOSPADM

## 2018-09-22 RX ORDER — SODIUM CHLORIDE 9 MG/ML
INJECTION, SOLUTION INTRAVENOUS
Status: DISPENSED
Start: 2018-09-22 | End: 2018-09-22

## 2018-09-22 RX ORDER — IPRATROPIUM BROMIDE AND ALBUTEROL SULFATE 2.5; .5 MG/3ML; MG/3ML
3 SOLUTION RESPIRATORY (INHALATION) EVERY 6 HOURS
Status: DISCONTINUED | OUTPATIENT
Start: 2018-09-22 | End: 2018-09-26 | Stop reason: HOSPADM

## 2018-09-22 RX ORDER — ENALAPRILAT 1.25 MG/ML
2.5 INJECTION INTRAVENOUS ONCE
Status: COMPLETED | OUTPATIENT
Start: 2018-09-22 | End: 2018-09-22

## 2018-09-22 RX ORDER — ENALAPRIL MALEATE 5 MG/1
5 TABLET ORAL DAILY
Status: DISCONTINUED | OUTPATIENT
Start: 2018-09-22 | End: 2018-09-26 | Stop reason: HOSPADM

## 2018-09-22 RX ORDER — GLUCAGON 1 MG
1 KIT INJECTION
Status: DISCONTINUED | OUTPATIENT
Start: 2018-09-22 | End: 2018-09-22

## 2018-09-22 RX ORDER — OXYCODONE HYDROCHLORIDE 5 MG/1
5 TABLET ORAL EVERY 6 HOURS PRN
Status: DISCONTINUED | OUTPATIENT
Start: 2018-09-22 | End: 2018-09-26 | Stop reason: HOSPADM

## 2018-09-22 RX ORDER — LIDOCAINE HYDROCHLORIDE 20 MG/ML
JELLY TOPICAL
Status: ACTIVE | OUTPATIENT
Start: 2018-09-22 | End: 2018-09-22

## 2018-09-22 RX ORDER — GABAPENTIN 300 MG/1
300 CAPSULE ORAL NIGHTLY
Status: DISCONTINUED | OUTPATIENT
Start: 2018-09-22 | End: 2018-09-26 | Stop reason: HOSPADM

## 2018-09-22 RX ORDER — SODIUM CHLORIDE 0.9 % (FLUSH) 0.9 %
5 SYRINGE (ML) INJECTION
Status: DISCONTINUED | OUTPATIENT
Start: 2018-09-22 | End: 2018-09-26 | Stop reason: HOSPADM

## 2018-09-22 RX ORDER — PROCHLORPERAZINE EDISYLATE 5 MG/ML
5 INJECTION INTRAMUSCULAR; INTRAVENOUS EVERY 6 HOURS PRN
Status: DISCONTINUED | OUTPATIENT
Start: 2018-09-22 | End: 2018-09-26 | Stop reason: HOSPADM

## 2018-09-22 RX ORDER — LIDOCAINE HYDROCHLORIDE 20 MG/ML
JELLY TOPICAL
Status: DISPENSED
Start: 2018-09-22 | End: 2018-09-22

## 2018-09-22 RX ORDER — EZETIMIBE 10 MG/1
10 TABLET ORAL DAILY
Status: DISCONTINUED | OUTPATIENT
Start: 2018-09-22 | End: 2018-09-26 | Stop reason: HOSPADM

## 2018-09-22 RX ORDER — ASCORBIC ACID 500 MG
500 TABLET ORAL NIGHTLY
Status: DISCONTINUED | OUTPATIENT
Start: 2018-09-22 | End: 2018-09-26 | Stop reason: HOSPADM

## 2018-09-22 RX ORDER — RAMELTEON 8 MG/1
8 TABLET ORAL NIGHTLY PRN
Status: DISCONTINUED | OUTPATIENT
Start: 2018-09-22 | End: 2018-09-26 | Stop reason: HOSPADM

## 2018-09-22 RX ORDER — PANTOPRAZOLE SODIUM 40 MG/10ML
40 INJECTION, POWDER, LYOPHILIZED, FOR SOLUTION INTRAVENOUS DAILY
Status: DISCONTINUED | OUTPATIENT
Start: 2018-09-22 | End: 2018-09-26

## 2018-09-22 RX ORDER — IPRATROPIUM BROMIDE AND ALBUTEROL SULFATE 2.5; .5 MG/3ML; MG/3ML
3 SOLUTION RESPIRATORY (INHALATION) EVERY 6 HOURS
Status: DISCONTINUED | OUTPATIENT
Start: 2018-09-22 | End: 2018-09-22

## 2018-09-22 RX ORDER — QUETIAPINE FUMARATE 100 MG/1
200 TABLET, FILM COATED ORAL NIGHTLY
Status: DISCONTINUED | OUTPATIENT
Start: 2018-09-22 | End: 2018-09-26 | Stop reason: HOSPADM

## 2018-09-22 RX ORDER — SIMVASTATIN 40 MG/1
40 TABLET, FILM COATED ORAL NIGHTLY
Status: DISCONTINUED | OUTPATIENT
Start: 2018-09-22 | End: 2018-09-26 | Stop reason: HOSPADM

## 2018-09-22 RX ORDER — GLUCAGON 1 MG
1 KIT INJECTION
Status: DISCONTINUED | OUTPATIENT
Start: 2018-09-22 | End: 2018-09-26 | Stop reason: HOSPADM

## 2018-09-22 RX ORDER — LORAZEPAM 1 MG/1
1 TABLET ORAL EVERY 6 HOURS PRN
Status: DISCONTINUED | OUTPATIENT
Start: 2018-09-22 | End: 2018-09-26 | Stop reason: HOSPADM

## 2018-09-22 RX ORDER — ASPIRIN 325 MG
325 TABLET, DELAYED RELEASE (ENTERIC COATED) ORAL 2 TIMES DAILY
Status: DISCONTINUED | OUTPATIENT
Start: 2018-09-22 | End: 2018-09-26 | Stop reason: HOSPADM

## 2018-09-22 RX ORDER — PROCHLORPERAZINE EDISYLATE 5 MG/ML
10 INJECTION INTRAMUSCULAR; INTRAVENOUS EVERY 6 HOURS PRN
Status: DISCONTINUED | OUTPATIENT
Start: 2018-09-22 | End: 2018-09-22

## 2018-09-22 RX ADMIN — ENALAPRIL MALEATE 5 MG: 5 TABLET ORAL at 09:09

## 2018-09-22 RX ADMIN — SODIUM CHLORIDE: 0.9 INJECTION, SOLUTION INTRAVENOUS at 09:09

## 2018-09-22 RX ADMIN — HYDROMORPHONE HYDROCHLORIDE 0.5 MG: 2 INJECTION INTRAMUSCULAR; INTRAVENOUS; SUBCUTANEOUS at 09:09

## 2018-09-22 RX ADMIN — PROCHLORPERAZINE EDISYLATE 10 MG: 5 INJECTION INTRAMUSCULAR; INTRAVENOUS at 02:09

## 2018-09-22 RX ADMIN — IOHEXOL 75 ML: 350 INJECTION, SOLUTION INTRAVENOUS at 01:09

## 2018-09-22 RX ADMIN — LORAZEPAM 1 MG: 1 TABLET ORAL at 10:09

## 2018-09-22 RX ADMIN — SIMVASTATIN 40 MG: 40 TABLET, FILM COATED ORAL at 09:09

## 2018-09-22 RX ADMIN — HYDROMORPHONE HYDROCHLORIDE 0.5 MG: 2 INJECTION INTRAMUSCULAR; INTRAVENOUS; SUBCUTANEOUS at 01:09

## 2018-09-22 RX ADMIN — ONDANSETRON 4 MG: 2 INJECTION INTRAMUSCULAR; INTRAVENOUS at 12:09

## 2018-09-22 RX ADMIN — DIPHENHYDRAMINE HYDROCHLORIDE 12.5 MG: 50 INJECTION, SOLUTION INTRAMUSCULAR; INTRAVENOUS at 12:09

## 2018-09-22 RX ADMIN — ASPIRIN 325 MG: 325 TABLET, DELAYED RELEASE ORAL at 09:09

## 2018-09-22 RX ADMIN — ENALAPRILAT 2.5 MG: 2.5 INJECTION INTRAVENOUS at 02:09

## 2018-09-22 RX ADMIN — HYDROMORPHONE HYDROCHLORIDE 0.5 MG: 2 INJECTION INTRAMUSCULAR; INTRAVENOUS; SUBCUTANEOUS at 06:09

## 2018-09-22 RX ADMIN — ENOXAPARIN SODIUM 40 MG: 100 INJECTION SUBCUTANEOUS at 05:09

## 2018-09-22 RX ADMIN — PROCHLORPERAZINE EDISYLATE 5 MG: 5 INJECTION INTRAMUSCULAR; INTRAVENOUS at 01:09

## 2018-09-22 RX ADMIN — HYDROMORPHONE HYDROCHLORIDE 0.5 MG: 2 INJECTION INTRAMUSCULAR; INTRAVENOUS; SUBCUTANEOUS at 05:09

## 2018-09-22 RX ADMIN — SODIUM CHLORIDE: 0.9 INJECTION, SOLUTION INTRAVENOUS at 04:09

## 2018-09-22 RX ADMIN — QUETIAPINE FUMARATE 200 MG: 100 TABLET ORAL at 09:09

## 2018-09-22 RX ADMIN — Medication 0.5 MG: at 02:09

## 2018-09-22 RX ADMIN — PROCHLORPERAZINE EDISYLATE 5 MG: 5 INJECTION INTRAMUSCULAR; INTRAVENOUS at 09:09

## 2018-09-22 RX ADMIN — GABAPENTIN 300 MG: 300 CAPSULE ORAL at 09:09

## 2018-09-22 RX ADMIN — LEVOTHYROXINE SODIUM 25 MCG: 25 TABLET ORAL at 09:09

## 2018-09-22 RX ADMIN — EZETIMIBE 10 MG: 10 TABLET ORAL at 09:09

## 2018-09-22 NOTE — PLAN OF CARE
Recommendations     Recommendation/Intervention:   1. With low BMI and likely extended NPO status Rec PPN:   -Clinimex 2.75/5 @ 100 ml/hr + 20% lipids daily  -To provide: 1172 kcal, 66g pro, 2400 ml fluid GIR: 1.75.   -Rec monitor K, Phos, Mg due to evidence of malnutrition (low BMI) and unsure of diet PTA (note, shifts mitigated by low dextrose formula, but can decrease goal rate to 70 ml/hr if evidence of shifts until lytes stable).  2. RD to f/u with progress, NFPE and advancement of kcal intake (when appropriate).     Goals: Initiate nutrition within 48-72 hrs  Nutrition Goal Status: new  Communication of RD Recs: reviewed with RN(plan of care)     D/C planning: Too soon to determine

## 2018-09-22 NOTE — CONSULTS
[facility]  Adult Nutrition  Consult Note    SUMMARY     Recommendations    Recommendation/Intervention:   1. With low BMI and likely extended NPO status Rec PPN:   -Clinimex 2.75/5 @ 100 ml/hr + 20% lipids daily  -To provide: 1172 kcal, 66g pro, 2400 ml fluid GIR: 1.75.   -Rec monitor K, Phos, Mg due to evidence of malnutrition (low BMI) and unsure of diet PTA (note, shifts mitigated by low dextrose formula, but can decrease goal rate to 70 ml/hr if evidence of shifts until lytes stable).  2. RD to f/u with progress, NFPE and advancement of kcal intake (when appropriate).    Goals: Initiate nutrition within 48-72 hrs  Nutrition Goal Status: new  Communication of RD Recs: reviewed with RN(plan of care)    D/C planning: Too soon to determine    Reason for Assessment    Reason for Assessment: consult  Diagnosis: (SBO)  Relevant Medical History: DM, Chrohn's, Diverticulitis, GERD, HLD, COPD; pain pump    General Information Comments: Spoke with nsg; reports unable to determine placement of NGT, unable to give po meds, pt in a lot of pain. NPO for SBO with noted hiatal hernia with nearly all of stomach in chest per CT findings; surgery consulted. Unable to perform NFPE due to remote coverage, but BMI indicates limited lean body mass and fat stores. Prev weight adm indicate similar UBW except one date of 59.5 kg in 4/2018, but unable to speak with pt over phone to confirm this number. In setting of likely malnutrition and extended NPO status, will provide PPN recs and have on-site RD f/u with NFPE and progress.     Nutrition Risk Screen    Nutrition Risk Screen: no indicators present    Nutrition/Diet History    Food Preferences: BHAVANA  Do you have any cultural, spiritual, Oriental orthodox conflicts, given your current situation?: Restorationism, no blood  Food Allergies: (noted latex (occassionally foods restricted with latex))  Factors Affecting Nutritional Intake: altered gastrointestinal function,  "NPO    Anthropometrics    Temp: 97.5 °F (36.4 °C)  Height Method: Stated  Height: 5' 6" (167.6 cm)  Height (inches): 66 in  Weight Method: Bed Scale  Weight: 47.4 kg (104 lb 8 oz)  Weight (lb): 104.5 lb  Ideal Body Weight (IBW), Female: 130 lb  % Ideal Body Weight, Female (lb): 80.38 lb  BMI (Calculated): 16.9  BMI Grade: 16 - 16.9 protein-energy malnutrition grade II  Weight Loss: (unsure of accuracy of 59.5 kg weight on 4/2018)       Lab/Procedures/Meds    Pertinent Labs Reviewed: reviewed  Pertinent Labs Comments: alb 3.4  Pertinent Medications Reviewed: reviewed  Pertinent Medications Comments: Vit C, statin, IVF, panto    Physical Findings/Assessment    Overall Physical Appearance: (BHAVANA; BMI indicates underweight/malnutrition)  Tubes: nasogastric tube  Oral/Mouth Cavity: WDL  Skin: intact    Estimated/Assessed Needs    Weight Used For Calorie Calculations: 47.4 kg (104 lb 8 oz)  Energy Calorie Requirements (kcal): 1175 (RMR x 1.25; can address weight repletion after initial clinical issues stabilized)  Energy Need Method: [place]  Protein Requirements: 57-66 g (1.2-1.4g/kg)  Weight Used For Protein Calculations: 47.4 kg (104 lb 8 oz)     Fluid Need Method: RDA Method  RDA Method (mL): 1175  CHO Requirement: 150g      Nutrition Prescription Ordered    Current Diet Order: NPO    Evaluation of Received Nutrient/Fluid Intake    IV Fluid (mL): 100(ml/hr of NS)  I/O: reviewed  Energy Calories Required: not meeting needs  Protein Required: not meeting needs  Fluid Required: (per MD)  Comments: LBM: unknown; pt reports regular BM PTA    % Meal Intake: NPO    Nutrition Risk    Level of Risk/Frequency of Follow-up: (2 x week)     Assessment and Plan    Nutrition Problem  Altered GI Function    Related to (etiology):   SBO; Hiatal hernia    Signs and Symptoms (as evidenced by):   NPO    Interventions/Recommendations (treatment strategy):  See recs    Nutrition Diagnosis Status:   New     Monitor and " Evaluation    Food and Nutrient Intake: energy intake, parenteral nutrition intake  Food and Nutrient Adminstration: diet order, enteral and parenteral nutrition administration  Knowledge/Beliefs/Attitudes: food and nutrition knowledge/skill  Physical Activity and Function: nutrition-related ADLs and IADLs  Anthropometric Measurements: weight, weight change  Biochemical Data, Medical Tests and Procedures: electrolyte and renal panel, glucose/endocrine profile  Nutrition-Focused Physical Findings: overall appearance     Nutrition Follow-Up    RD Follow-up?: Yes
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
## 2018-09-22 NOTE — PLAN OF CARE
Patient seen and examined. Await surgical evaluation. Xray pending to confirm NG tube placement.  Otherwise agree with current management.

## 2018-09-22 NOTE — ASSESSMENT & PLAN NOTE
Supplemental O2 via nasal canula; titrate O2 saturation to >92%.   Continue beta 2 agonist bronchodilator treatments.   Continue chronic medications.

## 2018-09-22 NOTE — PLAN OF CARE
Problem: Patient Care Overview  Goal: Plan of Care Review  Outcome: Ongoing (interventions implemented as appropriate)  Nc 2 lpm in use with aerosol txs Q6 hrs with Breo Qam

## 2018-09-22 NOTE — HPI
"Shazia Stacy is a 84 y.o. female with PMHx of DM, thyroid disease, diverticulitis, Crohn's disease, HLD, and COPD who presented to the ED with complaints of abdominal pain associated with N/V which began suddenly about 2 hours PTA. Per daughter, the patient has had regular bowel movements. Denies fever, chest pain, headache, and dysuria.  Patient was administered "a little bit of Zofran" earlier today with no improvements to nausea. Daughter reports that pt has a hx of SBO about 3 years ago.  A CT abd/pelvis was performed; pt could not tolerate po contrast; however, CT scan was performed with IV dye indicating small-bowel obstruction without free air. Upon my assessment, pt is actively vomiting and refusing NGT placement.  Ms. Stacy will be admitted to the service of hospital medicine for further treatment.      "
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
## 2018-09-22 NOTE — ASSESSMENT & PLAN NOTE
Acute onset N/V this evening.  Previous hx SBO.  Opioid dependent.  NPO-pt currently refusing NGT.  I have stressed the importance of cooperating with plan of care and recommend that she agree to NGT placement.  Gastrointestinal decompression via NGT to LIS   Surgical consult.  IV hydration  KUB daily  Pain control requiring IV opioids  Antiemetics

## 2018-09-22 NOTE — PLAN OF CARE
Problem: Patient Care Overview  Goal: Plan of Care Review  Pt. NG tube curled in esophagus with tip in stomach. Dr parul . Ordered another CXR and Abd. XRAY. NPO maintained. Informed Dr Gerber that pt. Kept NPO due to waiting for confirmation of placement ot NG tube. Medicated for nausea and vomiting and pain. Morphine pain pump not in use per order.

## 2018-09-22 NOTE — PT/OT/SLP PROGRESS
"Physical Therapy      Patient Name:  Shazia Stacy   MRN:  [id number]    Patient not seen today secondary to pt refused  ("leave me alone") @ ~10:30 am. Will follow-up.    Rodo Almanzar, PT    "

## 2018-09-22 NOTE — ASSESSMENT & PLAN NOTE
Diet controlled.  Check hgb A1C  Check blood glucose level q AC/HS.  Use Novolog Insulin Sliding Scale as needed.

## 2018-09-22 NOTE — NURSING
Spoke with Dr. Montenegro about xrays for NG tube placement. Ordered CXR I view and abdominal Xray and called Radiology to notify me upon arrival to floor to remove tubes and telemetry to avoid interference with the picture.
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
## 2018-09-23 LAB
ALBUMIN SERPL BCP-MCNC: 2.7 G/DL
ALP SERPL-CCNC: 91 U/L
ALT SERPL W/O P-5'-P-CCNC: 12 U/L
ANION GAP SERPL CALC-SCNC: 6 MMOL/L
AST SERPL-CCNC: 21 U/L
BASOPHILS # BLD AUTO: 0 K/UL
BASOPHILS NFR BLD: 0.5 %
BILIRUB SERPL-MCNC: 0.4 MG/DL
BUN SERPL-MCNC: 21 MG/DL
CALCIUM SERPL-MCNC: 7.4 MG/DL
CHLORIDE SERPL-SCNC: 111 MMOL/L
CO2 SERPL-SCNC: 24 MMOL/L
CREAT SERPL-MCNC: 0.8 MG/DL
DIFFERENTIAL METHOD: ABNORMAL
EOSINOPHIL # BLD AUTO: 0.2 K/UL
EOSINOPHIL NFR BLD: 2.7 %
ERYTHROCYTE [DISTWIDTH] IN BLOOD BY AUTOMATED COUNT: 15.9 %
EST. GFR  (AFRICAN AMERICAN): >60 ML/MIN/1.73 M^2
EST. GFR  (NON AFRICAN AMERICAN): >60 ML/MIN/1.73 M^2
GLUCOSE SERPL-MCNC: 90 MG/DL
HCT VFR BLD AUTO: 31.4 %
HGB BLD-MCNC: 10.1 G/DL
LYMPHOCYTES # BLD AUTO: 1.6 K/UL
LYMPHOCYTES NFR BLD: 22.9 %
MAGNESIUM SERPL-MCNC: 2 MG/DL
MCH RBC QN AUTO: 30.3 PG
MCHC RBC AUTO-ENTMCNC: 32.4 G/DL
MCV RBC AUTO: 94 FL
MONOCYTES # BLD AUTO: 0.6 K/UL
MONOCYTES NFR BLD: 8.9 %
NEUTROPHILS # BLD AUTO: 4.6 K/UL
NEUTROPHILS NFR BLD: 65 %
PHOSPHATE SERPL-MCNC: 3.4 MG/DL
PLATELET # BLD AUTO: 176 K/UL
PMV BLD AUTO: 8.7 FL
POCT GLUCOSE: 79 MG/DL (ref 70–110)
POCT GLUCOSE: 88 MG/DL (ref 70–110)
POCT GLUCOSE: 92 MG/DL (ref 70–110)
POCT GLUCOSE: 93 MG/DL (ref 70–110)
POTASSIUM SERPL-SCNC: 3.9 MMOL/L
PROT SERPL-MCNC: 5.7 G/DL
RBC # BLD AUTO: 3.35 M/UL
SODIUM SERPL-SCNC: 141 MMOL/L
WBC # BLD AUTO: 7.1 K/UL

## 2018-09-23 PROCEDURE — 97530 THERAPEUTIC ACTIVITIES: CPT

## 2018-09-23 PROCEDURE — 83735 ASSAY OF MAGNESIUM: CPT

## 2018-09-23 PROCEDURE — C9113 INJ PANTOPRAZOLE SODIUM, VIA: HCPCS | Performed by: INTERNAL MEDICINE

## 2018-09-23 PROCEDURE — 36415 COLL VENOUS BLD VENIPUNCTURE: CPT

## 2018-09-23 PROCEDURE — 84100 ASSAY OF PHOSPHORUS: CPT

## 2018-09-23 PROCEDURE — 27000221 HC OXYGEN, UP TO 24 HOURS

## 2018-09-23 PROCEDURE — 25000003 PHARM REV CODE 250: Performed by: INTERNAL MEDICINE

## 2018-09-23 PROCEDURE — 94761 N-INVAS EAR/PLS OXIMETRY MLT: CPT

## 2018-09-23 PROCEDURE — 63600175 PHARM REV CODE 636 W HCPCS: Performed by: INTERNAL MEDICINE

## 2018-09-23 PROCEDURE — 85025 COMPLETE CBC W/AUTO DIFF WBC: CPT

## 2018-09-23 PROCEDURE — 25000003 PHARM REV CODE 250: Performed by: SURGERY

## 2018-09-23 PROCEDURE — 99231 SBSQ HOSP IP/OBS SF/LOW 25: CPT | Mod: ,,, | Performed by: SURGERY

## 2018-09-23 PROCEDURE — 99900035 HC TECH TIME PER 15 MIN (STAT)

## 2018-09-23 PROCEDURE — 97161 PT EVAL LOW COMPLEX 20 MIN: CPT

## 2018-09-23 PROCEDURE — 12000002 HC ACUTE/MED SURGE SEMI-PRIVATE ROOM

## 2018-09-23 PROCEDURE — 80053 COMPREHEN METABOLIC PANEL: CPT

## 2018-09-23 RX ADMIN — GABAPENTIN 300 MG: 300 CAPSULE ORAL at 08:09

## 2018-09-23 RX ADMIN — QUETIAPINE FUMARATE 200 MG: 100 TABLET ORAL at 08:09

## 2018-09-23 RX ADMIN — SODIUM PHOSPHATE, DIBASIC AND SODIUM PHOSPHATE, MONOBASIC 1 ENEMA: 7; 19 ENEMA RECTAL at 04:09

## 2018-09-23 RX ADMIN — HYDROMORPHONE HYDROCHLORIDE 0.5 MG: 2 INJECTION INTRAMUSCULAR; INTRAVENOUS; SUBCUTANEOUS at 10:09

## 2018-09-23 RX ADMIN — ENALAPRIL MALEATE 5 MG: 5 TABLET ORAL at 09:09

## 2018-09-23 RX ADMIN — ENOXAPARIN SODIUM 40 MG: 100 INJECTION SUBCUTANEOUS at 03:09

## 2018-09-23 RX ADMIN — ASPIRIN 325 MG: 325 TABLET, DELAYED RELEASE ORAL at 09:09

## 2018-09-23 RX ADMIN — SIMVASTATIN 40 MG: 40 TABLET, FILM COATED ORAL at 08:09

## 2018-09-23 RX ADMIN — LORAZEPAM 1 MG: 1 TABLET ORAL at 08:09

## 2018-09-23 RX ADMIN — EZETIMIBE 10 MG: 10 TABLET ORAL at 09:09

## 2018-09-23 RX ADMIN — PANTOPRAZOLE SODIUM 40 MG: 40 INJECTION, POWDER, LYOPHILIZED, FOR SOLUTION INTRAVENOUS at 09:09

## 2018-09-23 RX ADMIN — LEVOTHYROXINE SODIUM 75 MCG: 50 TABLET ORAL at 11:09

## 2018-09-23 RX ADMIN — PROCHLORPERAZINE EDISYLATE 5 MG: 5 INJECTION INTRAMUSCULAR; INTRAVENOUS at 10:09

## 2018-09-23 RX ADMIN — LEVOTHYROXINE SODIUM 25 MCG: 25 TABLET ORAL at 09:09

## 2018-09-23 NOTE — NURSING
Spoke with Dr. Montenegro . Told Xray improperly placed and was removed and pt. Refused to have it replaced in front of another RN and her Daughter.Dr. Montenegro stated NG tube had to be replaced due to SBO. Oncoming nurse informed of order that tube needed to be replaced.Charge nurse also aware.

## 2018-09-23 NOTE — HPI
85 yo F admitted to hospital overnight with PSBO.  Pt notes that she developd acute onset of abdominal pain last night prompting her to go to the ER.  This morning at time of my visit she noted diffuse abd pain.  She could not localize pain to a particular area just noting that her entire abdomen was sore.  Last night at time of presentation pt had reportedly had significant amounts of emesis.  Pt notes that she has had flatus since admission.  Her last reported Bm was yesterday AM.  Pt reportedly suffers with Crohn's disease.  She has had previous abdominal surgery however pt and family member are not entirely sure for what. PT suffers with COPD, and DM.  She has a pain pump in her L lower abdomen.  Pt lives with her daughter

## 2018-09-23 NOTE — PROGRESS NOTES
"[facility] Medicine  Progress Note    Patient Name: Shazia Stacy  MRN: [id number]  Patient Class: IP- Inpatient   Admission Date: 9/21/2018  Length of Stay: 1 days  Attending Physician: Josselyn Estes MD  Primary Care Provider: Nikhil Albrecht MD        Subjective:     Principal Problem:SBO (small bowel obstruction)    HPI:  Shazia Stacy is a 84 y.o. female with PMHx of DM, thyroid disease, diverticulitis, Crohn's disease, HLD, and COPD who presented to the ED with complaints of abdominal pain associated with N/V which began suddenly about 2 hours PTA. Per daughter, the patient has had regular bowel movements. Denies fever, chest pain, headache, and dysuria.  Patient was administered "a little bit of Zofran" earlier today with no improvements to nausea. Daughter reports that pt has a hx of SBO about 3 years ago.  A CT abd/pelvis was performed; pt could not tolerate po contrast; however, CT scan was performed with IV dye indicating small-bowel obstruction without free air. Upon my assessment, pt is actively vomiting and refusing NGT placement.  Ms. Stacy will be admitted to the service of hospital medicine for further treatment.        Hospital Course:  No notes on file    Interval History: Patient seen and examined. No acute events. Nausea improved with nG tube.     Review of Systems   Constitutional: Positive for fatigue. Negative for activity change, appetite change and fever.   HENT: Negative.    Eyes: Negative.    Respiratory: Negative for chest tightness, shortness of breath and wheezing.    Cardiovascular: Negative for chest pain, palpitations and leg swelling.   Gastrointestinal: Positive for abdominal pain and nausea. Negative for abdominal distention, blood in stool, diarrhea and vomiting.   Genitourinary: Negative for dysuria and hematuria.   Neurological: Negative for headaches.   Hematological: Negative for adenopathy.   Psychiatric/Behavioral: Negative for confusion. "     Objective:     Vital Signs (Most Recent):  Temp: 96.6 °F (35.9 °C) (09/23/18 1210)  Pulse: 73 (09/23/18 1210)  Resp: 18 (09/23/18 1210)  BP: (!) 124/58 (09/23/18 1210)  SpO2: 99 % (09/23/18 1210) Vital Signs (24h Range):  Temp:  [96.1 °F (35.6 °C)-99.2 °F (37.3 °C)] 96.6 °F (35.9 °C)  Pulse:  [] 73  Resp:  [16-20] 18  SpO2:  [93 %-99 %] 99 %  BP: (118-239)/() 124/58     Weight: 47.4 kg (104 lb 8 oz)  Body mass index is 16.87 kg/m².    Intake/Output Summary (Last 24 hours) at 9/23/2018 1347  Last data filed at 9/22/2018 1800  Gross per 24 hour   Intake 1316.67 ml   Output --   Net 1316.67 ml      Physical Exam   Constitutional: She is oriented to person, place, and time. She appears well-developed and well-nourished. No distress.   HENT:   Head: Normocephalic and atraumatic.   Ng Tube in place   Eyes: Pupils are equal, round, and reactive to light.   Neck: Neck supple. No thyromegaly present.   Cardiovascular: Normal rate and regular rhythm. Exam reveals no gallop and no friction rub.   No murmur heard.  Pulmonary/Chest: Effort normal and breath sounds normal. No respiratory distress. She has no wheezes.   Abdominal: Soft. Bowel sounds are normal. She exhibits no distension. There is no tenderness. There is no guarding.   Musculoskeletal: Normal range of motion. She exhibits no edema.   Neurological: She is alert and oriented to person, place, and time.   Skin: Skin is warm and dry. No erythema.   Psychiatric: She has a normal mood and affect.       Significant Labs:   CBC:   Recent Labs   Lab  09/22/18   0012  09/22/18   0636  09/23/18   0553   WBC  7.30  8.60  7.10   HGB  11.5*  11.9*  10.1*   HCT  34.1*  36.5*  31.4*   PLT  203  219  176       Significant Imaging: I have reviewed all pertinent imaging results/findings within the past 24 hours.    Assessment/Plan:      * SBO (small bowel obstruction)              COPD (chronic obstructive pulmonary disease)    Supplemental O2 via nasal canula;  titrate O2 saturation to >92%.   Continue beta 2 agonist bronchodilator treatments.   Continue chronic medications.                 Opioid dependence    Chronic pain with chronic opioid use.  Pt has dilaudid pump.    Treat for pain as indicated.          Severe malnutrition    Body mass index is 16.88 kg/m².  Pt appears thin, muscle wasting.  Consult nutrition.          Debility    Fall precautions  PT/OT consult          Hypothyroidism    Chronic. Continue home Levothyroxine.  Check TSH.          Non-insulin dependent type 2 diabetes mellitus    Diet controlled.  Check hgb A1C  Check blood glucose level q AC/HS.  Use Novolog Insulin Sliding Scale as needed.           Chronic respiratory failure    Continue supplemental oxygen as needed.          Anemia    Chronic problem.  Stable.  Monitor H/H.  Transfuse for hemodynamic instability and/or H/H <7/21          Crohn's disease without complication    Chronic.   Followed by Dr. Degroot.          Small bowel obstruction    - NG tube to LIWS  - NPO  - Consideration for trial of clears this afternoon  - Surgery on board            VTE Risk Mitigation (From admission, onward)        Ordered     enoxaparin injection 40 mg  Daily      09/22/18 0410     Place JEANNE hose  Until discontinued      09/22/18 0410     Place sequential compression device  Until discontinued      09/22/18 0410              Fritz Gerber MD  Department of Hospital Medicine   [facility]

## 2018-09-23 NOTE — NURSING
Spoke with XRAY dept. About NG placement . Confirmed placement in abdomen. Spoke to Rosa Gr Radiology technologist.

## 2018-09-23 NOTE — ASSESSMENT & PLAN NOTE
- NG tube to LIWS  - NPO  - Consideration for trial of clears this afternoon  - Surgery on board

## 2018-09-23 NOTE — PLAN OF CARE
Problem: Patient Care Overview  Goal: Plan of Care Review  Outcome: Revised  Pt is AAOx4, [place].  Pt c/o pain, prn medication given, pt tolerated well.  IVF infusing, no redness or swelling at site.  Cardiac monitoring in place, NSR.  BG monitored, SSI given prn as needed.  NG tube to R nare on low intermittent suction, no redness or swelling at site.  VSS, in NAD, pt remains afebrile.  Pt remains free from injury.  Bed in low position, wheels locked, call light within reach.  Pt verbalized understanding of POC.  Will continue to monitor.
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
## 2018-09-23 NOTE — ASSESSMENT & PLAN NOTE
D/ shawnt and her daughter.  Strongly recommended NPO, bowel rest, and NG decompression.  If no improvement in next 24-48 hours will consider SBFT or repeat ct scan with po contrast.
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
## 2018-09-23 NOTE — PLAN OF CARE
"SW met w/ pt and dtrSepideh, for assessment.  Pt lives in her own "home" which is attached to Dtr's home.  Pt denies current HH, has had [facility] previously, requests same provider if HH is ordered.  Pt denies Advanced Directives.  Pt has family support to assist.         09/23/18 1029   Discharge Assessment   Assessment Type Discharge Planning Assessment   Assessment information obtained from? Patient;Caregiver  (w/ dtr Sepideh @ bedside)   Prior to hospitilization cognitive status: Alert/Oriented   Prior to hospitalization functional status: Needs Assistance;Assistive Equipment   Current cognitive status: Alert/Oriented   Current Functional Status: Needs Assistance;Assistive Equipment   Facility Arrived From: home   Lives With alone  (lives next door to dtr)   Able to Return to Prior Arrangements unable to determine at this time (comments)   Is patient able to care for self after discharge? Unable to determine at this time (comments)   Who are your caregiver(s) and their phone number(s)? dtjuan c [facility]  [phone]   Patient's perception of discharge disposition home health   Readmission Within The Last 30 Days no previous admission in last 30 days   Patient currently being followed by outpatient case management? No   Patient currently receives any other outside agency services? No   Equipment Currently Used at Home walker, rolling;rollator;wheelchair;bedside commode;bath bench   Do you have any problems affording any of your prescribed medications? No   Is the patient taking medications as prescribed? yes   Does the patient have transportation home? Yes   Transportation Available family or friend will provide   Does the patient receive services at the [facility]? No   Discharge Plan A Home Health;Home with family   Discharge Plan B Home Health   Patient/Family In Agreement With Plan yes     "

## 2018-09-23 NOTE — SUBJECTIVE & OBJECTIVE
Interval History: Patient seen and examined. No acute events. Nausea improved with nG tube.     Review of Systems   Constitutional: Positive for fatigue. Negative for activity change, appetite change and fever.   HENT: Negative.    Eyes: Negative.    Respiratory: Negative for chest tightness, shortness of breath and wheezing.    Cardiovascular: Negative for chest pain, palpitations and leg swelling.   Gastrointestinal: Positive for abdominal pain and nausea. Negative for abdominal distention, blood in stool, diarrhea and vomiting.   Genitourinary: Negative for dysuria and hematuria.   Neurological: Negative for headaches.   Hematological: Negative for adenopathy.   Psychiatric/Behavioral: Negative for confusion.     Objective:     Vital Signs (Most Recent):  Temp: 96.6 °F (35.9 °C) (09/23/18 1210)  Pulse: 73 (09/23/18 1210)  Resp: 18 (09/23/18 1210)  BP: (!) 124/58 (09/23/18 1210)  SpO2: 99 % (09/23/18 1210) Vital Signs (24h Range):  Temp:  [96.1 °F (35.6 °C)-99.2 °F (37.3 °C)] 96.6 °F (35.9 °C)  Pulse:  [] 73  Resp:  [16-20] 18  SpO2:  [93 %-99 %] 99 %  BP: (118-239)/() 124/58     Weight: 47.4 kg (104 lb 8 oz)  Body mass index is 16.87 kg/m².    Intake/Output Summary (Last 24 hours) at 9/23/2018 1347  Last data filed at 9/22/2018 1800  Gross per 24 hour   Intake 1316.67 ml   Output --   Net 1316.67 ml      Physical Exam   Constitutional: She is oriented to person, place, and time. She appears well-developed and well-nourished. No distress.   HENT:   Head: Normocephalic and atraumatic.   Ng Tube in place   Eyes: Pupils are equal, round, and reactive to light.   Neck: Neck supple. No thyromegaly present.   Cardiovascular: Normal rate and regular rhythm. Exam reveals no gallop and no friction rub.   No murmur heard.  Pulmonary/Chest: Effort normal and breath sounds normal. No respiratory distress. She has no wheezes.   Abdominal: Soft. Bowel sounds are normal. She exhibits no distension. There is no  tenderness. There is no guarding.   Musculoskeletal: Normal range of motion. She exhibits no edema.   Neurological: She is alert and oriented to person, place, and time.   Skin: Skin is warm and dry. No erythema.   Psychiatric: She has a normal mood and affect.       Significant Labs:   CBC:   Recent Labs   Lab  09/22/18   0012  09/22/18   0636  09/23/18   0553   WBC  7.30  8.60  7.10   HGB  11.5*  11.9*  10.1*   HCT  34.1*  36.5*  31.4*   PLT  203  219  176       Significant Imaging: I have reviewed all pertinent imaging results/findings within the past 24 hours.

## 2018-09-23 NOTE — PLAN OF CARE
Problem: Patient Care Overview  Goal: Plan of Care Review  Enema given with large liquid results. Voiding lots of foul smelling urine. Daughter stays with pt. Pt. Anxious,repeats herself .NG tube right nare. No signs of infection or excoriation.Repositioning in bed with assist. Briefs used. No breakdowns noted. Left lower leg below knee red, warm to touch. Informed Dr. Gerber of redness to left lower leg. No cough or congestion noted. No drainage from intermittent suction IV   fluids as ordered. Explained plan of care with pt. And daughter.

## 2018-09-23 NOTE — PLAN OF CARE
Problem: Patient Care Overview  Goal: Plan of Care Review  Outcome: Ongoing (interventions implemented as appropriate)  Nc 2 lpm in use with patient refusing any resp txs at this time

## 2018-09-24 LAB
ALBUMIN SERPL BCP-MCNC: 2.8 G/DL
ALP SERPL-CCNC: 83 U/L
ALT SERPL W/O P-5'-P-CCNC: 12 U/L
ANION GAP SERPL CALC-SCNC: 12 MMOL/L
AST SERPL-CCNC: 19 U/L
BASOPHILS # BLD AUTO: 0 K/UL
BASOPHILS NFR BLD: 0.4 %
BILIRUB SERPL-MCNC: 0.4 MG/DL
BUN SERPL-MCNC: 19 MG/DL
CALCIUM SERPL-MCNC: 7.5 MG/DL
CHLORIDE SERPL-SCNC: 110 MMOL/L
CO2 SERPL-SCNC: 19 MMOL/L
CREAT SERPL-MCNC: 0.7 MG/DL
DIFFERENTIAL METHOD: ABNORMAL
EOSINOPHIL # BLD AUTO: 0.2 K/UL
EOSINOPHIL NFR BLD: 3.7 %
ERYTHROCYTE [DISTWIDTH] IN BLOOD BY AUTOMATED COUNT: 15.5 %
EST. GFR  (AFRICAN AMERICAN): >60 ML/MIN/1.73 M^2
EST. GFR  (NON AFRICAN AMERICAN): >60 ML/MIN/1.73 M^2
GLUCOSE SERPL-MCNC: 66 MG/DL
HCT VFR BLD AUTO: 30.7 %
HGB BLD-MCNC: 9.9 G/DL
LYMPHOCYTES # BLD AUTO: 1.9 K/UL
LYMPHOCYTES NFR BLD: 32 %
MAGNESIUM SERPL-MCNC: 1.9 MG/DL
MCH RBC QN AUTO: 30.1 PG
MCHC RBC AUTO-ENTMCNC: 32.2 G/DL
MCV RBC AUTO: 94 FL
MONOCYTES # BLD AUTO: 0.4 K/UL
MONOCYTES NFR BLD: 7.4 %
NEUTROPHILS # BLD AUTO: 3.3 K/UL
NEUTROPHILS NFR BLD: 56.5 %
PHOSPHATE SERPL-MCNC: 2.6 MG/DL
PLATELET # BLD AUTO: 167 K/UL
PMV BLD AUTO: 9 FL
POCT GLUCOSE: 108 MG/DL (ref 70–110)
POCT GLUCOSE: 63 MG/DL (ref 70–110)
POCT GLUCOSE: 68 MG/DL (ref 70–110)
POCT GLUCOSE: 77 MG/DL (ref 70–110)
POCT GLUCOSE: 97 MG/DL (ref 70–110)
POTASSIUM SERPL-SCNC: 3.4 MMOL/L
PROT SERPL-MCNC: 5.9 G/DL
RBC # BLD AUTO: 3.28 M/UL
SODIUM SERPL-SCNC: 141 MMOL/L
WBC # BLD AUTO: 5.8 K/UL

## 2018-09-24 PROCEDURE — 27000221 HC OXYGEN, UP TO 24 HOURS

## 2018-09-24 PROCEDURE — 94761 N-INVAS EAR/PLS OXIMETRY MLT: CPT

## 2018-09-24 PROCEDURE — 25000003 PHARM REV CODE 250: Performed by: NURSE PRACTITIONER

## 2018-09-24 PROCEDURE — 63600175 PHARM REV CODE 636 W HCPCS: Performed by: SURGERY

## 2018-09-24 PROCEDURE — 99900035 HC TECH TIME PER 15 MIN (STAT)

## 2018-09-24 PROCEDURE — 84100 ASSAY OF PHOSPHORUS: CPT

## 2018-09-24 PROCEDURE — 25000242 PHARM REV CODE 250 ALT 637 W/ HCPCS: Performed by: INTERNAL MEDICINE

## 2018-09-24 PROCEDURE — 85025 COMPLETE CBC W/AUTO DIFF WBC: CPT

## 2018-09-24 PROCEDURE — 97165 OT EVAL LOW COMPLEX 30 MIN: CPT

## 2018-09-24 PROCEDURE — 83735 ASSAY OF MAGNESIUM: CPT

## 2018-09-24 PROCEDURE — 97110 THERAPEUTIC EXERCISES: CPT

## 2018-09-24 PROCEDURE — 99232 SBSQ HOSP IP/OBS MODERATE 35: CPT | Mod: ,,, | Performed by: INTERNAL MEDICINE

## 2018-09-24 PROCEDURE — 36415 COLL VENOUS BLD VENIPUNCTURE: CPT

## 2018-09-24 PROCEDURE — 63600175 PHARM REV CODE 636 W HCPCS: Performed by: INTERNAL MEDICINE

## 2018-09-24 PROCEDURE — 94640 AIRWAY INHALATION TREATMENT: CPT

## 2018-09-24 PROCEDURE — 25000003 PHARM REV CODE 250: Performed by: INTERNAL MEDICINE

## 2018-09-24 PROCEDURE — C9113 INJ PANTOPRAZOLE SODIUM, VIA: HCPCS | Performed by: INTERNAL MEDICINE

## 2018-09-24 PROCEDURE — 80053 COMPREHEN METABOLIC PANEL: CPT

## 2018-09-24 PROCEDURE — G8987 SELF CARE CURRENT STATUS: HCPCS | Mod: CK

## 2018-09-24 PROCEDURE — 97803 MED NUTRITION INDIV SUBSEQ: CPT

## 2018-09-24 PROCEDURE — 12000002 HC ACUTE/MED SURGE SEMI-PRIVATE ROOM

## 2018-09-24 PROCEDURE — G8988 SELF CARE GOAL STATUS: HCPCS | Mod: CK

## 2018-09-24 PROCEDURE — 97535 SELF CARE MNGMENT TRAINING: CPT

## 2018-09-24 PROCEDURE — 99231 SBSQ HOSP IP/OBS SF/LOW 25: CPT | Mod: ,,, | Performed by: SURGERY

## 2018-09-24 RX ORDER — METOCLOPRAMIDE HYDROCHLORIDE 5 MG/ML
10 INJECTION INTRAMUSCULAR; INTRAVENOUS EVERY 6 HOURS
Status: DISCONTINUED | OUTPATIENT
Start: 2018-09-24 | End: 2018-09-26 | Stop reason: HOSPADM

## 2018-09-24 RX ADMIN — POTASSIUM PHOSPHATE, MONOBASIC AND POTASSIUM PHOSPHATE, DIBASIC 30 MMOL: 224; 236 INJECTION, SOLUTION INTRAVENOUS at 12:09

## 2018-09-24 RX ADMIN — EZETIMIBE 10 MG: 10 TABLET ORAL at 12:09

## 2018-09-24 RX ADMIN — SIMVASTATIN 40 MG: 40 TABLET, FILM COATED ORAL at 09:09

## 2018-09-24 RX ADMIN — DEXTROSE MONOHYDRATE 12.5 G: 25 INJECTION, SOLUTION INTRAVENOUS at 05:09

## 2018-09-24 RX ADMIN — QUETIAPINE FUMARATE 200 MG: 100 TABLET ORAL at 09:09

## 2018-09-24 RX ADMIN — OXYCODONE HYDROCHLORIDE 5 MG: 5 TABLET ORAL at 03:09

## 2018-09-24 RX ADMIN — OXYCODONE HYDROCHLORIDE AND ACETAMINOPHEN 500 MG: 500 TABLET ORAL at 09:09

## 2018-09-24 RX ADMIN — FLUTICASONE FUROATE AND VILANTEROL TRIFENATATE 1 PUFF: 100; 25 POWDER RESPIRATORY (INHALATION) at 07:09

## 2018-09-24 RX ADMIN — METOCLOPRAMIDE 10 MG: 5 INJECTION, SOLUTION INTRAMUSCULAR; INTRAVENOUS at 12:09

## 2018-09-24 RX ADMIN — PANTOPRAZOLE SODIUM 40 MG: 40 INJECTION, POWDER, LYOPHILIZED, FOR SOLUTION INTRAVENOUS at 09:09

## 2018-09-24 RX ADMIN — HYDROMORPHONE HYDROCHLORIDE 0.5 MG: 2 INJECTION INTRAMUSCULAR; INTRAVENOUS; SUBCUTANEOUS at 10:09

## 2018-09-24 RX ADMIN — LORAZEPAM 1 MG: 1 TABLET ORAL at 04:09

## 2018-09-24 RX ADMIN — PROCHLORPERAZINE EDISYLATE 5 MG: 5 INJECTION INTRAMUSCULAR; INTRAVENOUS at 10:09

## 2018-09-24 RX ADMIN — LEVOTHYROXINE SODIUM 175 MCG: 125 TABLET ORAL at 05:09

## 2018-09-24 RX ADMIN — PROCHLORPERAZINE EDISYLATE 5 MG: 5 INJECTION INTRAMUSCULAR; INTRAVENOUS at 03:09

## 2018-09-24 RX ADMIN — ENALAPRIL MALEATE 5 MG: 5 TABLET ORAL at 12:09

## 2018-09-24 RX ADMIN — ASPIRIN 325 MG: 325 TABLET, DELAYED RELEASE ORAL at 09:09

## 2018-09-24 RX ADMIN — HYDROMORPHONE HYDROCHLORIDE 0.5 MG: 2 INJECTION INTRAMUSCULAR; INTRAVENOUS; SUBCUTANEOUS at 02:09

## 2018-09-24 RX ADMIN — SODIUM CHLORIDE: 0.9 INJECTION, SOLUTION INTRAVENOUS at 02:09

## 2018-09-24 RX ADMIN — GABAPENTIN 300 MG: 300 CAPSULE ORAL at 09:09

## 2018-09-24 NOTE — PLAN OF CARE
Problem: Physical Therapy Goal  Goal: Physical Therapy Goal  Goals to be met by: 10/07/18     Patient will increase functional independence with mobility by performin. Supine to sit with Contact Guard Assistance  2. Bed to chair transfer with Minimal Assistance.  3. Gait  x 50 feet with Minimal Assistance using Rolling Walker.   4. Lower extremity exercise program x 15 reps, with assistance as needed     Outcome: Ongoing (interventions implemented as appropriate)  Patient participated in therapeutic exercise and activities to improve functional mobility, improve ADL's and promote safe ambulation to decrease fall risk.

## 2018-09-24 NOTE — PROGRESS NOTES
Pt seen and examined this AM.  Feeling well.  Denies n/v.  rports flatus throughout the night    AFVSS  AAox3  Sinus  Soft/nt/nd BS present    Flat/erect: nonobstructive bowel gas pattern    A/P: Resolved PSBO  Start clears and adv as tolerated.  Will sign off

## 2018-09-24 NOTE — PT/OT/SLP PROGRESS
Physical Therapy Treatment    Patient Name:  Shazia Stacy   MRN:  [id number]    Recommendations:     Discharge Recommendations:      Discharge Equipment Recommendations:     Barriers to discharge: None    Assessment:     Shazia Stacy is a 84 y.o. female admitted with a medical diagnosis of SBO (small bowel obstruction).  She presents with the following impairments/functional limitations:    Patient was motivated today for exercise and no complaints of nausea. She declined to ambulate due to feeling light headed, but worked well performing all BLE exercises to help improve LE strength and return to PLOF with decreased risk for falls.    Rehab Prognosis:  Fair; patient would benefit from acute skilled PT services to address these deficits and reach maximum level of function.      Recent Surgery: * No surgery found *      Plan:     During this hospitalization, patient to be seen 6 x/week to address the above listed problems via gait training, therapeutic activities, therapeutic exercises  · Plan of Care Expires:  10/07/18   Plan of Care Reviewed with: patient, daughter    Subjective     Communicated with nursing, [place] prior to session.  Patient found in supine upon PT entry to room, agreeable to treatment.      Chief Complaint: she is cold  Patient comments/goals: wants to go home  Pain/Comfort:  · Pain Rating 1: 0/10  · Pain Rating Post-Intervention 1: 0/10    Patients cultural, spiritual, Adventism conflicts given the current situation:      Objective:     Patient found with:       General Precautions: Standard, fall, contact   Orthopedic Precautions:N/A   Braces:         AM-PAC 6 CLICK MOBILITY          Therapeutic Activities and Exercises:   Supine exercises 1 x 10 ea: AP, HS, QS, GS, resisted hip ab/add, SLR, bridges, SKTC, SAQ.    Patient left HOB elevated with all lines intact, call button in reach and nursing notified..    GOALS:   Multidisciplinary Problems     Physical Therapy Goals        Problem: Physical  Therapy Goal    Goal Priority Disciplines Outcome Goal Variances Interventions   Physical Therapy Goal     PT, PT/OT Ongoing (interventions implemented as appropriate)     Description:  Goals to be met by: 10/07/18     Patient will increase functional independence with mobility by performin. Supine to sit with Contact Guard Assistance  2. Bed to chair transfer with Minimal Assistance.  3. Gait  x 50 feet with Minimal Assistance using Rolling Walker.   4. Lower extremity exercise program x 15 reps, with assistance as needed                      Time Tracking:     PT Received On: 18  PT Start Time: 1018     PT Stop Time: 1036  PT Total Time (min): 18 min     Billable Minutes: Therapeutic Exercise 18       PT/PTA: PTA     PTA Visit Number: 1     Jigna Ronquillo, PTA  2018

## 2018-09-24 NOTE — CONSULTS
[facility]  Adult Nutrition  Consult Note    SUMMARY     Recommendations    Recommendation:   1)  With low BMI and hx of poor PO intake Rec PPN until pt is able to consume >=50% EEN PO:   -Clinimex 2.75/5 @ 100 ml/hr + 20% lipids daily  -To provide: 1172 kcal, 66g pro, 2400 ml fluid GIR: 1.75.   2) Advance diet as tolerated to low fiber.  3) Add Boost Glucose Control, BID  Intervention:  4) Severe malnutrition identified per ASPEN guidelines.     Goals: 1) Initiate nutrition within 48-72 hrs 2) Pt will consume/receive >=85% EEN by discharge.   Nutrition Goal Status: 1) met 2) new  Communication of RD Recs: (plan of care, sticky note)    D/C planning: Too soon to determine    Reason for Assessment    Reason for Assessment: RD follow up  Diagnosis: (SBO)  Relevant Medical History: DM, Chrohn's, Diverticulitis, GERD, HLD, COPD; pain pump    General Information Comments: Spoke with nsg; reports unable to determine placement of NGT, unable to give po meds, pt in a lot of pain. NPO for SBO with noted hiatal hernia with nearly all of stomach in chest per CT findings; surgery consulted. Unable to perform NFPE due to remote coverage, but BMI indicates limited lean body mass and fat stores. Prev weight adm indicate similar UBW except one date of 59.5 kg in 4/2018, but unable to speak with pt over phone to confirm this number. In setting of likely malnutrition and extended NPO status, will provide PPN recs and have on-site RD f/u with NFPE and progress.   9/24/18: Pt alert with daughter at bedside. Pt lives with daughter in her own area. Reports pt usually eats cereal or oatmeal for breakfast, around 10 am sweet potato with vegetables, around 1-2 peanut butter sandwich and occasionally eats out at [employer] or [employer].  Notes that pt does not like to eat past then as it affects her hiatal hernia and she is uncomfortable.  No indication that 59.5 kg is valid 2/2 pt's wt hx going back to 10/9/17 shows that  "pt's current wt is her UBW.  NFPE revealed significant fat/muscle loss. (in S&S in PES).  Pt has been NPO x 4 days, now advanced to clear.    Nutrition Risk Screen    Nutrition Risk Screen: no indicators present    Nutrition/Diet History    Food Preferences: BHAVANA  Do you have any cultural, spiritual, Jew conflicts, given your current situation?: Adventist, no blood  Food Allergies: (noted latex (occassionally foods restricted with latex))  Factors Affecting Nutritional Intake: altered gastrointestinal function, NPO    Anthropometrics    Temp: 97.5 °F (36.4 °C)  Height Method: Stated  Height: 5' 6" (167.6 cm)  Height (inches): 66 in  Weight Method: Bed Scale  Weight: 47.4 kg (104 lb 8 oz)  Weight (lb): 104.5 lb  Ideal Body Weight (IBW), Female: 130 lb  % Ideal Body Weight, Female (lb): 80.38 lb  BMI (Calculated): 16.9  BMI Grade: 16 - 16.9 protein-energy malnutrition grade II  Weight Loss: (unsure of accuracy of 59.5 kg weight on 4/2018)       Lab/Procedures/Meds    Pertinent Labs Reviewed: reviewed  Lab Results   Component Value Date    ALBUMIN 2.8 (L) 09/24/2018     Lab Results   Component Value Date    WBC 5.80 09/24/2018     Glucose 66, A1C 6.0    Pertinent Medications Reviewed: reviewed  Pertinent Medications Comments: Vit C, statin, IVF, panto    Physical Findings/Assessment    Overall Physical Appearance: (BHAVANA; BMI indicates underweight/malnutrition)  Tubes: nasogastric tube  Oral/Mouth Cavity: WDL  Skin: intact    Estimated/Assessed Needs    Weight Used For Calorie Calculations: 47.4 kg (104 lb 8 oz)  Energy Calorie Requirements (kcal): 1175 (RMR x 1.25; can address weight repletion after initial clinical issues stabilized)  Energy Need Method: [place]  Protein Requirements: 57-66 g (1.2-1.4g/kg)  Weight Used For Protein Calculations: 47.4 kg (104 lb 8 oz)     Fluid Need Method: RDA Method  RDA Method (mL): 1175  CHO Requirement: 150g      Nutrition Prescription Ordered    Current Diet " Order: NPO    Evaluation of Received Nutrient/Fluid Intake    IV Fluid (mL): 100(ml/hr of NS)  I/O:+ 4916.7 since admit    Energy Calories Required: not meeting needs  Protein Required: not meeting needs  Fluid Required: (per MD)  Comments: LBM: 9/23/18; pt reports regular BM PTA    % Meal Intake: NPO    Nutrition Risk    Level of Risk/Frequency of Follow-up: (2 x week)     Assessment and Plan    Severe malnutrition  [x]  Unprioritized   Change Dx Resolve      Details  Code: E43  Noted: 04/05/2018  Updated: Today   Paris Larson RD       Overview    Current Assessment & Plan Note  Edited:  Paris Larson RD  Today  Contributing Nutrition Diagnosis  Severe malnutrition in context of acute on chronic illness     Related to (etiology):   Altered GI function 2/2 SBO with hx of Crohns disease and diveriticulosis     Signs and Symptoms (as evidenced by):   1) Severe fat/muscle depletion noted around temples, eyes, depressions on dorsal area of hand, protruding clavicle, and in upper arm  2) Debility, decreased ability to perform ADLs per PT note and nursing functional screen  3) Underweight with BMI 16.9     Interventions/Recommendations (treatment strategy):  1) Consider PPN until pt is able to tolerate >=50% EEN PO. 2) Progress per pt tolerance to low fiber diet with nutrition supplement, BID     Nutrition Diagnosis Status:   New              Inadequate calorie/protein intake for undetermined time frame  r/t inability to tolerate foods throughout the day as evidenced pt statement of intolerance of intolerance of foods later in the afternoons and continued low BMI since 10/9/17.      Nutrition Problem  Altered GI Function    Related to (etiology):   SBO; Hiatal hernia    Signs and Symptoms (as evidenced by):   NPO    Interventions/Recommendations (treatment strategy):  See recs    Nutrition Diagnosis Status:   New     Monitor and Evaluation    Food and Nutrient Intake: energy intake, parenteral nutrition intake  Food and  Nutrient Adminstration: diet order, enteral and parenteral nutrition administration  Knowledge/Beliefs/Attitudes: food and nutrition knowledge/skill  Physical Activity and Function: nutrition-related ADLs and IADLs  Anthropometric Measurements: weight, weight change  Biochemical Data, Medical Tests and Procedures: electrolyte and renal panel, glucose/endocrine profile  Nutrition-Focused Physical Findings: overall appearance     Nutrition Follow-Up    RD Follow-up?: Yes

## 2018-09-24 NOTE — NURSING
Patient lost IV access; charge nurse started new IV in left UA. IV infiltrated, also.  Called Dr. Estes; he gave ok to leave IV out.

## 2018-09-24 NOTE — PLAN OF CARE
Problem: Patient Care Overview  Goal: Individualization & Mutuality  Recommendation:   1)  With low BMI and hx of poor PO intake Rec PPN until pt is able to consume >=50% EEN PO:   -Clinimex 2.75/5 @ 100 ml/hr + 20% lipids daily  -To provide: 1172 kcal, 66g pro, 2400 ml fluid GIR: 1.75.   2) Advance diet as tolerated to low fiber.  3) Add Boost Glucose Control, BID  Intervention:  4) Severe malnutrition identified per ASPEN guidelines.      Goals: 1) Initiate nutrition within 48-72 hrs 2) Pt will consume/receive >=85% EEN by discharge.   Nutrition Goal Status: 1) met 2) new  Communication of RD Recs: (plan of care, sticky note)

## 2018-09-24 NOTE — ASSESSMENT & PLAN NOTE
Contributing Nutrition Diagnosis  Severe malnutrition in context of acute on chronic illness    Related to (etiology):   Altered GI function 2/2 SBO with hx of Crohns disease and diveriticulosis    Signs and Symptoms (as evidenced by):   1) Severe fat/muscle depletion noted around temples, eyes, depressions on dorsal area of hand, protruding clavicle, and in upper arm  2) Debility, decreased ability to perform ADLs per PT note and nursing functional screen  3) Underweight with BMI 16.9    Interventions/Recommendations (treatment strategy):  1) Consider PPN until pt is able to tolerate >=50% EEN PO. 2) Progress per pt tolerance to low fiber diet with nutrition supplement, BID    Nutrition Diagnosis Status:   Continues

## 2018-09-24 NOTE — PLAN OF CARE
Problem: Patient Care Overview  Goal: Plan of Care Review  Outcome: Revised  Pt is AAOx4.  Pt c/o pain, prn medication given, pt tolerated well.  IVF infusing, infusing abx as ordered, no redness or swelling at site.  Cardiac monitoring in place, NSR.  BG monitored, SSI given prn as ordered.  VSS, in NAD, pt remains afebrile.  Pt remains free from injury.  Bed in low position, wheels locked, call light within reach.  Pt verbalized understanding of POC.  Will continue to monitor.

## 2018-09-24 NOTE — NURSING
Pt AM BG was 63.  Gave 12.5 mL of D5 to pt through PIV.  Rechecked BG at 0600, BG was 108.  Will continue to monitor.

## 2018-09-24 NOTE — PLAN OF CARE
09/24/18 0758   Patient Assessment/Suction   Level of Consciousness (AVPU) alert   Respiratory Effort Unlabored   Expansion/Accessory Muscles/Retractions expansion symmetric   All Lung Fields Breath Sounds diminished   PRE-TX-[id number]-ETCO2   O2 Device (Oxygen Therapy) nasal cannula   $ Is the patient on Low Flow Oxygen? Yes   Flow (L/min) 2   Oxygen Concentration (%) 28   SpO2 99 %   Pulse Oximetry Type Intermittent   $ Pulse Oximetry - Multiple Charge Pulse Oximetry - Multiple   Pulse 92   Resp 16   Aerosol Therapy   $ Aerosol Therapy Charges Refused   Inhaler   $ Inhaler Charges MDI (Metered Dose Inahler) Treatment;Mouth rinsed post treatment   Respiratory Treatment Status given   SVN/Inhaler Treatment Route mouthpiece   Patient Tolerance good   Ready to Wean/Extubation Screen   FIO2<=50 (chart decimal) 0.28   receives mdi qday, aerosol tx q6 for which she refused

## 2018-09-24 NOTE — PLAN OF CARE
Problem: Occupational Therapy Goal  Goal: Occupational Therapy Goal  Goals to be met by: 10/1/18     Patient will increase functional independence with ADLs by performing:    UE Dressing with Set-up Assistance.  LE Dressing with Set-up Assistance, Minimal Assistance and Assistive Devices as needed.  Toileting from bedside commode with Stand-by Assistance and Assistive Devices as needed for hygiene and clothing management.   Toilet transfer to bedside commode with Stand-by Assistance.  Upper extremity exercise program x10 reps per handout, with supervision.    Outcome: Ongoing (interventions implemented as appropriate)  OT evaluation completed today. Goals & care plan established.    RADHA Feldman  9/24/2018

## 2018-09-24 NOTE — PROGRESS NOTES
"Progress Note  Hospital Medicine  Patient Name:Shazia Stacy  MRN:  [id number]  Patient Class: IP- Inpatient  Admit Date: 9/21/2018  Length of Stay: 2 days  Expected Discharge Date:   Attending Physician: Josselyn Estes MD  Primary Care Provider:  Nikhil Albrecht MD    SUBJECTIVE:     Principal Problem: SBO (small bowel obstruction)  Initial history of present illness: Shazia Stacy is a 84 y.o. female with PMHx of DM, thyroid disease, diverticulitis, Crohn's disease, HLD, and COPD who presented to the ED with complaints of abdominal pain associated with N/V which began suddenly about 2 hours PTA. Per daughter, the patient has had regular bowel movements. Denies fever, chest pain, headache, and dysuria.  Patient was administered "a little bit of Zofran" earlier today with no improvements to nausea. Daughter reports that pt has a hx of SBO about 3 years ago.  A CT abd/pelvis was performed; pt could not tolerate po contrast; however, CT scan was performed with IV dye indicating small-bowel obstruction without free air. Upon my assessment, pt is actively vomiting and refusing NGT placement.  Ms. Stacy will be admitted to the service of hospital medicine for further treatment.    PMH/PSH/SH/FH/Meds: reviewed.    Symptoms/Review of Systems: NGT in place. Patient reported flatus and one BM last night. No shortness of breath, cough, chest pain or headache, fever or abdominal pain.     Diet: NPO  Activity level: Normal.    Pain:  Patient reports no pain.       OBJECTIVE:   Vital Signs (Most Recent):      Temp: 96.2 °F (35.7 °C) (09/24/18 0418)  Pulse: 92 (09/24/18 0758)  Resp: 16 (09/24/18 0758)  BP: (!) 163/74 (09/24/18 0418)  SpO2: 99 % (09/24/18 0758)       Vital Signs Range (Last 24H):  Temp:  [96.2 °F (35.7 °C)-98.3 °F (36.8 °C)]   Pulse:  []   Resp:  [16-20]   BP: (124-187)/(58-81)   SpO2:  [96 %-99 %]     Weight: 47.4 kg (104 lb 8 oz)  Body mass index is 16.87 kg/m².    Intake/Output Summary (Last 24 hours) at 9/24/2018 " 0927  Last data filed at 9/24/2018 0600  Gross per 24 hour   Intake 3600 ml   Output --   Net 3600 ml     Physical Examination:  Constitutional: She is oriented to person, place, and time. She appears well-developed and well-nourished. No distress. Thin and frail female with significant muscle wasting.  HENT:   Head: Normocephalic and atraumatic.   Ng Tube in place   Eyes: Pupils are equal, round, and reactive to light.   Neck: Neck supple. No thyromegaly present.   Cardiovascular: Normal rate and regular rhythm. Exam reveals no gallop and no friction rub.   No murmur heard.  Pulmonary/Chest: Effort normal and breath sounds normal. No respiratory distress. She has no wheezes.   Abdominal: Soft. Bowel sounds are normal. She exhibits no distension. There is no tenderness. There is no guarding.   Musculoskeletal: Normal range of motion. She exhibits no edema.   Neurological: She is alert and oriented to person, place, and time.   Skin: Skin is warm and dry. No erythema.   Psychiatric: She has a normal mood and affect.     CBC:  Recent Labs   Lab  09/22/18   0636  09/23/18   0553  09/24/18   0533   WBC  8.60  7.10  5.80   RBC  3.94*  3.35*  3.28*   HGB  11.9*  10.1*  9.9*   HCT  36.5*  31.4*  30.7*   PLT  219  176  167   MCV  93  94  94   MCH  30.2  30.3  30.1   MCHC  32.6  32.4  32.2   BMP  Recent Labs   Lab  09/22/18   0636  09/23/18   0553  09/24/18   0533   GLU  128*  90  66*   NA  141  141  141   K  4.1  3.9  3.4*   CL  105  111*  110   CO2  28  24  19*   BUN  19  21  19   CREATININE  0.9  0.8  0.7   CALCIUM  8.3*  7.4*  7.5*   MG  2.2  2.0  1.9      Diagnostic Results:  Microbiology Results (last 7 days)     ** No results found for the last 168 hours. **         CT abdomen with contrast:  1. Small bowel obstruction.  A discrete transition point is not identified.  2. Large hiatal hernia with intrathoracic stomach.  3. Small volume right pleural effusion and ascites.  4. Unchanged pulmonary nodules.    KUB: 1.  Partially imaged hiatal hernia.  2. Small bowel obstruction.    CXR: 1. Large hiatal hernia with intrathoracic stomach.  2. Gastric tube is coiled in the upper esophagus with tip in the stomach.  Correlate for desired position.    Assessment/Plan:               COPD (chronic obstructive pulmonary disease)     Supplemental O2 via nasal canula; titrate O2 saturation to >92%.   Continue beta 2 agonist bronchodilator treatments.   Continue chronic medications.        Opioid dependence     Chronic pain with chronic opioid use.  Pt has dilaudid pump.    Treat for pain as indicated.       Severe malnutrition     Body mass index is 16.88 kg/m².  Pt appears thin, muscle wasting.  Consult nutrition.       Debility     Fall precautions  PT/OT consult       Hypothyroidism     Chronic. Continue home Levothyroxine.       Non-insulin dependent type 2 diabetes mellitus     Diet controlled.  Check hgb A1C  Check blood glucose level q AC/HS.  Use Novolog Insulin Sliding Scale as needed.        Chronic respiratory failure     Continue supplemental oxygen as needed.       Anemia     Chronic problem.  Stable.  Monitor H/H.  Transfuse for hemodynamic instability and/or H/H <7/21       Crohn's disease without complication     Chronic.   Followed by Dr. Degroot.      Pulmonary nodule  Patient advised of continued surveillance of pulmonary nodule.     Small bowel obstruction     - Discussed with Dr. Nicholson, advance clear liquids and DC NGT.     VTE Risk Mitigation (From admission, onward)        Ordered     enoxaparin injection 40 mg  Daily      09/22/18 0410     Place JEANNE hose  Until discontinued      09/22/18 0410     Place sequential compression device  Until discontinued      09/22/18 0410        Josselyn Estes MD  Department of Hospital Medicine   [facility]
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
## 2018-09-25 LAB
ALBUMIN SERPL BCP-MCNC: 2.8 G/DL
ALP SERPL-CCNC: 86 U/L
ALT SERPL W/O P-5'-P-CCNC: 11 U/L
ANION GAP SERPL CALC-SCNC: 9 MMOL/L
AORTIC ROOT ANNULUS: 2.64 CM
AORTIC VALVE CUSP SEPERATION: 1.11 CM
AST SERPL-CCNC: 17 U/L
AV MEAN GRADIENT: 4.83 MMHG
AV PEAK GRADIENT: 8.37 MMHG
AV VALVE AREA: 1.94 CM2
BASOPHILS # BLD AUTO: 0 K/UL
BASOPHILS NFR BLD: 0.2 %
BILIRUB SERPL-MCNC: 0.5 MG/DL
BSA FOR ECHO PROCEDURE: 1.48 M2
BUN SERPL-MCNC: 15 MG/DL
CALCIUM SERPL-MCNC: 7.7 MG/DL
CHLORIDE SERPL-SCNC: 105 MMOL/L
CO2 SERPL-SCNC: 23 MMOL/L
CREAT SERPL-MCNC: 0.7 MG/DL
CV ECHO LV RWT: 0.5 CM
DIFFERENTIAL METHOD: ABNORMAL
DOP CALC AO PEAK VEL: 1.45 M/S
DOP CALC AO VTI: 29.46 CM
DOP CALC LVOT AREA: 3.11 CM2
DOP CALC LVOT DIAMETER: 1.99 CM
DOP CALC LVOT STROKE VOLUME: 57.29 CM3
DOP CALCLVOT PEAK VEL VTI: 18.43 CM
E WAVE DECELERATION TIME: 144.12 MSEC
E/A RATIO: 0.89
E/E' RATIO: 9.88
ECHO LV POSTERIOR WALL: 1.11 CM (ref 0.6–1.1)
EOSINOPHIL # BLD AUTO: 0.1 K/UL
EOSINOPHIL NFR BLD: 1 %
ERYTHROCYTE [DISTWIDTH] IN BLOOD BY AUTOMATED COUNT: 14.6 %
EST. GFR  (AFRICAN AMERICAN): >60 ML/MIN/1.73 M^2
EST. GFR  (NON AFRICAN AMERICAN): >60 ML/MIN/1.73 M^2
FRACTIONAL SHORTENING: 35 % (ref 28–44)
GLUCOSE SERPL-MCNC: 87 MG/DL
HCT VFR BLD AUTO: 32.2 %
HGB BLD-MCNC: 10.6 G/DL
INTERVENTRICULAR SEPTUM: 1.07 CM (ref 0.6–1.1)
IVRT: 0.09 MSEC
LEFT ATRIUM SIZE: 5.04 CM
LEFT INTERNAL DIMENSION IN SYSTOLE: 2.86 CM (ref 2.1–4)
LEFT VENTRICLE MASS INDEX: 112.3 G/M2
LEFT VENTRICULAR INTERNAL DIMENSION IN DIASTOLE: 4.39 CM (ref 3.5–6)
LEFT VENTRICULAR MASS: 166.16 G
LV LATERAL E/E' RATIO: 9.33
LV SEPTAL E/E' RATIO: 10.5
LYMPHOCYTES # BLD AUTO: 1.5 K/UL
LYMPHOCYTES NFR BLD: 22.5 %
MAGNESIUM SERPL-MCNC: 1.7 MG/DL
MCH RBC QN AUTO: 30.3 PG
MCHC RBC AUTO-ENTMCNC: 33 G/DL
MCV RBC AUTO: 92 FL
MONOCYTES # BLD AUTO: 0.5 K/UL
MONOCYTES NFR BLD: 7.7 %
MV PEAK A VEL: 0.94 M/S
MV PEAK E VEL: 0.84 M/S
MV STENOSIS PRESSURE HALF TIME: 41.79 MS
MV VALVE AREA P 1/2 METHOD: 5.26 CM2
NEUTROPHILS # BLD AUTO: 4.6 K/UL
NEUTROPHILS NFR BLD: 68.6 %
PHOSPHATE SERPL-MCNC: 3 MG/DL
PISA TR MAX VEL: 2.27 M/S
PLATELET # BLD AUTO: 187 K/UL
PMV BLD AUTO: 8.9 FL
POCT GLUCOSE: 106 MG/DL (ref 70–110)
POCT GLUCOSE: 146 MG/DL (ref 70–110)
POCT GLUCOSE: 71 MG/DL (ref 70–110)
POCT GLUCOSE: 74 MG/DL (ref 70–110)
POCT GLUCOSE: 82 MG/DL (ref 70–110)
POTASSIUM SERPL-SCNC: 3.5 MMOL/L
PROT SERPL-MCNC: 5.8 G/DL
PV PEAK GRADIENT: 4.49 MMHG
PV PEAK VELOCITY: 1.06 CM/S
RA PRESSURE: 3 MMHG
RBC # BLD AUTO: 3.51 M/UL
RIGHT VENTRICULAR END-DIASTOLIC DIMENSION: 2.99 CM
SODIUM SERPL-SCNC: 137 MMOL/L
TDI LATERAL: 0.09
TDI SEPTAL: 0.08
TDI: 0.09
TR MAX PG: 20.61 MMHG
TRICUSPID ANNULAR PLANE SYSTOLIC EXCURSION: 0.02 CM
TROPONIN I SERPL DL<=0.01 NG/ML-MCNC: 0.02 NG/ML
TROPONIN I SERPL DL<=0.01 NG/ML-MCNC: 0.03 NG/ML
TROPONIN I SERPL DL<=0.01 NG/ML-MCNC: 0.03 NG/ML
TV REST PULMONARY ARTERY PRESSURE: 23.61 MMHG
WBC # BLD AUTO: 6.7 K/UL

## 2018-09-25 PROCEDURE — 12000002 HC ACUTE/MED SURGE SEMI-PRIVATE ROOM

## 2018-09-25 PROCEDURE — 94640 AIRWAY INHALATION TREATMENT: CPT

## 2018-09-25 PROCEDURE — 27000221 HC OXYGEN, UP TO 24 HOURS

## 2018-09-25 PROCEDURE — 83735 ASSAY OF MAGNESIUM: CPT

## 2018-09-25 PROCEDURE — 25000003 PHARM REV CODE 250: Performed by: INTERNAL MEDICINE

## 2018-09-25 PROCEDURE — 63600175 PHARM REV CODE 636 W HCPCS: Performed by: SURGERY

## 2018-09-25 PROCEDURE — 63600175 PHARM REV CODE 636 W HCPCS: Performed by: INTERNAL MEDICINE

## 2018-09-25 PROCEDURE — 84484 ASSAY OF TROPONIN QUANT: CPT | Mod: 91

## 2018-09-25 PROCEDURE — 36415 COLL VENOUS BLD VENIPUNCTURE: CPT

## 2018-09-25 PROCEDURE — 84100 ASSAY OF PHOSPHORUS: CPT

## 2018-09-25 PROCEDURE — C9113 INJ PANTOPRAZOLE SODIUM, VIA: HCPCS | Performed by: INTERNAL MEDICINE

## 2018-09-25 PROCEDURE — 80053 COMPREHEN METABOLIC PANEL: CPT

## 2018-09-25 PROCEDURE — 85025 COMPLETE CBC W/AUTO DIFF WBC: CPT

## 2018-09-25 PROCEDURE — 25000003 PHARM REV CODE 250: Performed by: NURSE PRACTITIONER

## 2018-09-25 PROCEDURE — 99232 SBSQ HOSP IP/OBS MODERATE 35: CPT | Mod: ,,, | Performed by: INTERNAL MEDICINE

## 2018-09-25 PROCEDURE — 94761 N-INVAS EAR/PLS OXIMETRY MLT: CPT

## 2018-09-25 PROCEDURE — 25000003 PHARM REV CODE 250: Performed by: SPECIALIST

## 2018-09-25 PROCEDURE — 93005 ELECTROCARDIOGRAM TRACING: CPT

## 2018-09-25 PROCEDURE — 99900035 HC TECH TIME PER 15 MIN (STAT)

## 2018-09-25 RX ORDER — METOPROLOL TARTRATE 25 MG/1
25 TABLET, FILM COATED ORAL 2 TIMES DAILY
Status: DISCONTINUED | OUTPATIENT
Start: 2018-09-25 | End: 2018-09-26 | Stop reason: HOSPADM

## 2018-09-25 RX ORDER — METOPROLOL TARTRATE 1 MG/ML
2.5 INJECTION, SOLUTION INTRAVENOUS ONCE
Status: COMPLETED | OUTPATIENT
Start: 2018-09-25 | End: 2018-09-25

## 2018-09-25 RX ADMIN — ENALAPRIL MALEATE 5 MG: 5 TABLET ORAL at 12:09

## 2018-09-25 RX ADMIN — FLUTICASONE FUROATE AND VILANTEROL TRIFENATATE 1 PUFF: 100; 25 POWDER RESPIRATORY (INHALATION) at 09:09

## 2018-09-25 RX ADMIN — OXYCODONE HYDROCHLORIDE 5 MG: 5 TABLET ORAL at 12:09

## 2018-09-25 RX ADMIN — SODIUM CHLORIDE 250 ML: 0.9 INJECTION, SOLUTION INTRAVENOUS at 02:09

## 2018-09-25 RX ADMIN — LEVOTHYROXINE SODIUM 175 MCG: 125 TABLET ORAL at 12:09

## 2018-09-25 RX ADMIN — METOCLOPRAMIDE 10 MG: 5 INJECTION, SOLUTION INTRAMUSCULAR; INTRAVENOUS at 12:09

## 2018-09-25 RX ADMIN — GABAPENTIN 300 MG: 300 CAPSULE ORAL at 08:09

## 2018-09-25 RX ADMIN — METOPROLOL TARTRATE 25 MG: 25 TABLET, FILM COATED ORAL at 08:09

## 2018-09-25 RX ADMIN — PANTOPRAZOLE SODIUM 40 MG: 40 INJECTION, POWDER, LYOPHILIZED, FOR SOLUTION INTRAVENOUS at 08:09

## 2018-09-25 RX ADMIN — METOPROLOL TARTRATE 2.5 MG: 1 INJECTION, SOLUTION INTRAVENOUS at 02:09

## 2018-09-25 RX ADMIN — QUETIAPINE FUMARATE 200 MG: 100 TABLET ORAL at 08:09

## 2018-09-25 RX ADMIN — LORAZEPAM 1 MG: 1 TABLET ORAL at 12:09

## 2018-09-25 RX ADMIN — RAMELTEON 8 MG: 8 TABLET, FILM COATED ORAL at 08:09

## 2018-09-25 RX ADMIN — EZETIMIBE 10 MG: 10 TABLET ORAL at 08:09

## 2018-09-25 RX ADMIN — SIMVASTATIN 40 MG: 40 TABLET, FILM COATED ORAL at 08:09

## 2018-09-25 RX ADMIN — OXYCODONE HYDROCHLORIDE 5 MG: 5 TABLET ORAL at 08:09

## 2018-09-25 RX ADMIN — LORAZEPAM 1 MG: 1 TABLET ORAL at 08:09

## 2018-09-25 RX ADMIN — ASPIRIN 325 MG: 325 TABLET, DELAYED RELEASE ORAL at 12:09

## 2018-09-25 RX ADMIN — SODIUM CHLORIDE: 0.9 INJECTION, SOLUTION INTRAVENOUS at 02:09

## 2018-09-25 NOTE — PLAN OF CARE
Receiving MDI Breo qday. She has aerosol tx ordered q6, but refuses them because she says she does not need one. 2lnc in use.

## 2018-09-25 NOTE — PROGRESS NOTES
Pt started on clears and tolerated throughout the night with no n/v.  Pt reportedly having flatus yesterday  Pt with severe agitation overnight.    Sleeping soundly at present.  Did not disturb.   D/w pt daughter    WOuld advance diet and anticipate d/c in next 24 hours if medically cleared.  WIll sign off reconsult prn

## 2018-09-25 NOTE — PROGRESS NOTES
MDI ordered qday, aerosol tx q6. Patient refuses aerosol tx saying she does not need one..     09/25/18 0900   PRE-TX-O2-ETCO2   O2 Device (Oxygen Therapy) nasal cannula   $ Is the patient on Low Flow Oxygen? Yes   Flow (L/min) 2   Oxygen Concentration (%) 28   SpO2 98 %   Pulse Oximetry Type Intermittent   $ Pulse Oximetry - Multiple Charge Pulse Oximetry - Multiple   Pulse 70   Resp 16   Aerosol Therapy   $ Aerosol Therapy Charges Refused   Inhaler   $ Inhaler Charges MDI (Metered Dose Inahler) Treatment   Respiratory Treatment Status given;mouth rinsed post treatment   SVN/Inhaler Treatment Route mouthpiece   Patient Tolerance good   Wound Care   Skin Color Other (see comments)   Ready to Wean/Extubation Screen   FIO2<=50 (chart decimal) 0.28

## 2018-09-25 NOTE — SIGNIFICANT EVENT
Hospital Medicine Significant Event Note      Admit Date: 9/21/2018  LOS: 3  Code Status: Full Code   CC: Tachycardia, agitation   Date of Consult: 09/25/2018  RRT Indication(s): Patient tachycardic on cardiac monitor with rates ranging from 140-160, agitated.  Attending Physician: Josselyn Estes MD  Primary Service: Networked reference to record PCT     SUBJECTIVE:     Interval history: Went to patient bedside, clearly agitated, wanting to get out of the bed.  Denied pain.  No respiratory distress.      OBJECTIVE:     Vital Signs (Most Recent):  Temp: 98.2 °F (36.8 °C) (09/25/18 0031)  Pulse: 71 (09/25/18 0235)  Resp: 16 (09/25/18 0235)  BP: (!) 97/56 (09/25/18 0235)  SpO2: 99 % (09/25/18 0235) Vital Signs (24h Range):  Temp:  [97.4 °F (36.3 °C)-98.7 °F (37.1 °C)] 98.2 °F (36.8 °C)  Pulse:  [] 71  Resp:  [16-18] 16  SpO2:  [95 %-99 %] 99 %  BP: ()/(56-96) 97/56     I & O (24h):    Intake/Output Summary (Last 24 hours) at 9/25/2018 0444  Last data filed at 9/24/2018 0600  Gross per 24 hour   Intake 3600 ml   Output --   Net 3600 ml        Physical Exam: Physical Exam   Constitutional: She is oriented to person, place, and time. She appears well-developed.   Cachetic, frail, elderly.     HENT:   Head: Normocephalic and atraumatic.   Very dry mucus membranes.   Eyes: Conjunctivae and EOM are normal. Pupils are equal, round, and reactive to light.   Neck: Normal range of motion. Neck supple. No JVD present. No tracheal deviation present. No thyromegaly present.   Cardiovascular: S1 normal, S2 normal, normal heart sounds and intact distal pulses. An irregularly irregular rhythm present. Tachycardia present.   Pulmonary/Chest: Effort normal and breath sounds normal. No stridor. No respiratory distress. She has no wheezes. She has no rales.   Abdominal: Soft. Bowel sounds are normal. She exhibits no distension. There is no tenderness. There is no guarding.   Musculoskeletal: Normal range of motion. She exhibits  no edema, tenderness or deformity.   Neurological: She is alert and oriented to person, place, and time. Coordination normal.   Skin: Skin is warm and dry.   Psychiatric: She is agitated.       Lines/Drains/Airway:           Nutrition:  Current Diet Order   Procedures    Diet clear liquid         Labs/Imaging- Reviewed- STAT EKG - atrial fibrillation, rate 119 bpm, No ST or T wave changes.      Diagnostics/Interventions during Rapid response     Insert IV.  Fluid bolus 250cc.  Metoprolol 2.5mg IV x one dose.  Assisted patient to chair.    ASSESSMENT/PLAN:     Active Hospital Problems    Diagnosis    *SBO (small bowel obstruction)    Opioid dependence    COPD (chronic obstructive pulmonary disease)    Severe malnutrition    Debility    Hypothyroidism    Non-insulin dependent type 2 diabetes mellitus    Chronic respiratory failure    Crohn's disease without complication    Anemia    Small bowel obstruction          Atrial fibrillation with RVR:  May be transient related to agitation or IVVD.  Consultation to Cardiology - follow recommendations.  Gave one time dose Metoprolol 2.5mg IV  Cardiac monitoring.  Likely high risk for falls, anticoagulation may be risk - will defer to cardiology.  Repeat EKG in am.  Trend troponin x 3.  Check echocardiogram        Uninterrupted Critical Care time (not including procedures): 30 minutes.

## 2018-09-25 NOTE — PT/OT/SLP PROGRESS
Physical Therapy      Patient Name:  Shazia Stacy   MRN:  [id number]    Patient not seen today secondary to nursing, Kareem placed patient on hold secondary to she was up all night and very agitated today and needs to rest  . Will follow-up tomorrow.    Jigna Ronquillo, PTA

## 2018-09-25 NOTE — PROGRESS NOTES
"Progress Note  Hospital Medicine  Patient Name:Shazia Stacy  MRN:  [id number]  Patient Class: IP- Inpatient  Admit Date: 9/21/2018  Length of Stay: 3 days  Expected Discharge Date:   Attending Physician: Josselyn Estes MD  Primary Care Provider:  Nikhil Albrecht MD    SUBJECTIVE:     Principal Problem: SBO (small bowel obstruction)  Initial history of present illness: Shazia Stacy is a 84 y.o. female with PMHx of DM, thyroid disease, diverticulitis, Crohn's disease, HLD, and COPD who presented to the ED with complaints of abdominal pain associated with N/V which began suddenly about 2 hours PTA. Per daughter, the patient has had regular bowel movements. Denies fever, chest pain, headache, and dysuria.  Patient was administered "a little bit of Zofran" earlier today with no improvements to nausea. Daughter reports that pt has a hx of SBO about 3 years ago.  A CT abd/pelvis was performed; pt could not tolerate po contrast; however, CT scan was performed with IV dye indicating small-bowel obstruction without free air. Upon my assessment, pt is actively vomiting and refusing NGT placement.  Ms. Stacy will be admitted to the service of hospital medicine for further treatment.    PMH/PSH/SH/FH/Meds: reviewed.    Symptoms/Review of Systems: NGT in place. Patient reported flatus and one BM last night. No shortness of breath, cough, chest pain or headache, fever or abdominal pain. Patient developed AF with RVR.  Diet: advance diet  Activity level: Normal.    Pain:  Patient reports no pain.       OBJECTIVE:   Vital Signs (Most Recent):      Temp: 98.3 °F (36.8 °C) (09/25/18 0810)  Pulse: 97 (09/25/18 0810)  Resp: 18 (09/25/18 0810)  BP: (!) 110/56 (09/25/18 0810)  SpO2: 99 % (09/25/18 0810)       Vital Signs Range (Last 24H):  Temp:  [98 °F (36.7 °C)-98.7 °F (37.1 °C)]   Pulse:  []   Resp:  [16-18]   BP: ()/(56-96)   SpO2:  [95 %-99 %]     Weight: 47.4 kg (104 lb 8 oz)  Body mass index is 16.87 kg/m².    Intake/Output " Summary (Last 24 hours) at 9/25/2018 1014  Last data filed at 9/25/2018 0235  Gross per 24 hour   Intake 1230 ml   Output --   Net 1230 ml     Physical Examination:  Constitutional: She is oriented to person, place, and time. She appears well-developed and well-nourished. No distress. Thin and frail female with significant muscle wasting.  HENT:   Head: Normocephalic and atraumatic.   Ng Tube in place   Eyes: Pupils are equal, round, and reactive to light.   Neck: Neck supple. No thyromegaly present.   Cardiovascular: Normal rate and regular rhythm. Exam reveals no gallop and no friction rub.   No murmur heard.  Pulmonary/Chest: Effort normal and breath sounds normal. No respiratory distress. She has no wheezes.   Abdominal: Soft. Bowel sounds are normal. She exhibits no distension. There is no tenderness. There is no guarding.   Musculoskeletal: Normal range of motion. She exhibits no edema.   Neurological: She is alert and oriented to person, place, and time.   Skin: Skin is warm and dry. No erythema.   Psychiatric: She has a normal mood and affect.     CBC:  Recent Labs   Lab  09/23/18   0553  09/24/18   0533  09/25/18   0612   WBC  7.10  5.80  6.70   RBC  3.35*  3.28*  3.51*   HGB  10.1*  9.9*  10.6*   HCT  31.4*  30.7*  32.2*   PLT  176  167  187   MCV  94  94  92   MCH  30.3  30.1  30.3   MCHC  32.4  32.2  33.0   BMP  Recent Labs   Lab  09/23/18   0553  09/24/18   0533  09/25/18   0612   GLU  90  66*  87   NA  141  141  137   K  3.9  3.4*  3.5   CL  111*  110  105   CO2  24  19*  23   BUN  21  19  15   CREATININE  0.8  0.7  0.7   CALCIUM  7.4*  7.5*  7.7*   MG  2.0  1.9  1.7      Diagnostic Results:  Microbiology Results (last 7 days)     ** No results found for the last 168 hours. **         CT abdomen with contrast:  1. Small bowel obstruction.  A discrete transition point is not identified.  2. Large hiatal hernia with intrathoracic stomach.  3. Small volume right pleural effusion and ascites.  4. Unchanged  pulmonary nodules.    KUB: 1. Partially imaged hiatal hernia.  2. Small bowel obstruction.    CXR: 1. Large hiatal hernia with intrathoracic stomach.  2. Gastric tube is coiled in the upper esophagus with tip in the stomach.  Correlate for desired position.    Assessment/Plan:     New onset AF with RVR  Rate controlled. Dr. Fraga is seeing the patient. Long-term anticoagulation as per Dr. Fraga. Follow 2 D ECHO. Continue BB.          COPD (chronic obstructive pulmonary disease)     Supplemental O2 via nasal canula; titrate O2 saturation to >92%.   Continue beta 2 agonist bronchodilator treatments.   Continue chronic medications.        Opioid dependence     Chronic pain with chronic opioid use.  Pt has dilaudid pump.    Treat for pain as indicated.       Severe malnutrition     Body mass index is 16.88 kg/m².  Pt appears thin, muscle wasting.  Consult nutrition.       Debility     Fall precautions  PT/OT consult       Hypothyroidism     Chronic. Continue home Levothyroxine.       Non-insulin dependent type 2 diabetes mellitus     Diet controlled.  Check hgb A1C  Check blood glucose level q AC/HS.  Use Novolog Insulin Sliding Scale as needed.        Chronic respiratory failure     Continue supplemental oxygen as needed.       Anemia     Chronic problem.  Stable.  Monitor H/H.  Transfuse for hemodynamic instability and/or H/H <7/21       Crohn's disease without complication     Chronic.   Followed by Dr. Degroot.      Pulmonary nodule  Patient advised of continued surveillance of pulmonary nodule.     Small bowel obstruction     - Discussed with Dr. Montenegro, advance diet     VTE Risk Mitigation (From admission, onward)        Ordered     enoxaparin injection 40 mg  Daily      09/22/18 0410     Place JEANNE hose  Until discontinued      09/22/18 0410     Place sequential compression device  Until discontinued      09/22/18 0410        Josselyn Estes MD  Department of Hospital Medicine   [facility]

## 2018-09-25 NOTE — PROGRESS NOTES
Patient calm and cooperative throughout the shift, daughter at bedside, patient able to transfer with assist, Pt remains free of falls, VS stable, RR even and unlabored, Abd non-distended, light BS in all four quadrants, clear speech, makes needs known, bed in low position with wheels locked call light in reach.

## 2018-09-25 NOTE — PLAN OF CARE
Problem: Patient Care Overview  Goal: Plan of Care Review  Outcome: Ongoing (interventions implemented as appropriate)  Plan of care discussed with patient. VS stable. Patient up to bedside commode with assistance. Daughter at bedside. Bed in lowest position, bed wheels locked and call button within reach. Will continue to monitor.

## 2018-09-26 VITALS
OXYGEN SATURATION: 98 % | WEIGHT: 104 LBS | HEIGHT: 66 IN | TEMPERATURE: 97 F | DIASTOLIC BLOOD PRESSURE: 54 MMHG | SYSTOLIC BLOOD PRESSURE: 110 MMHG | HEART RATE: 63 BPM | RESPIRATION RATE: 18 BRPM | BODY MASS INDEX: 16.71 KG/M2

## 2018-09-26 LAB
ALBUMIN SERPL BCP-MCNC: 2.5 G/DL
ALP SERPL-CCNC: 85 U/L
ALT SERPL W/O P-5'-P-CCNC: 12 U/L
ANION GAP SERPL CALC-SCNC: 8 MMOL/L
AST SERPL-CCNC: 23 U/L
BASOPHILS # BLD AUTO: 0 K/UL
BASOPHILS NFR BLD: 0 %
BILIRUB SERPL-MCNC: 0.3 MG/DL
BUN SERPL-MCNC: 14 MG/DL
CALCIUM SERPL-MCNC: 7.7 MG/DL
CHLORIDE SERPL-SCNC: 112 MMOL/L
CO2 SERPL-SCNC: 22 MMOL/L
CREAT SERPL-MCNC: 0.8 MG/DL
DIFFERENTIAL METHOD: ABNORMAL
EOSINOPHIL # BLD AUTO: 0.2 K/UL
EOSINOPHIL NFR BLD: 2.7 %
ERYTHROCYTE [DISTWIDTH] IN BLOOD BY AUTOMATED COUNT: 15.2 %
EST. GFR  (AFRICAN AMERICAN): >60 ML/MIN/1.73 M^2
EST. GFR  (NON AFRICAN AMERICAN): >60 ML/MIN/1.73 M^2
GLUCOSE SERPL-MCNC: 89 MG/DL
HCT VFR BLD AUTO: 29.4 %
HGB BLD-MCNC: 9.5 G/DL
LYMPHOCYTES # BLD AUTO: 2.3 K/UL
LYMPHOCYTES NFR BLD: 33.3 %
MAGNESIUM SERPL-MCNC: 1.9 MG/DL
MCH RBC QN AUTO: 30.1 PG
MCHC RBC AUTO-ENTMCNC: 32.5 G/DL
MCV RBC AUTO: 93 FL
MONOCYTES # BLD AUTO: 0.8 K/UL
MONOCYTES NFR BLD: 11 %
NEUTROPHILS # BLD AUTO: 3.7 K/UL
NEUTROPHILS NFR BLD: 53 %
PHOSPHATE SERPL-MCNC: 3.2 MG/DL
PLATELET # BLD AUTO: 179 K/UL
PMV BLD AUTO: 9 FL
POCT GLUCOSE: 91 MG/DL (ref 70–110)
POCT GLUCOSE: 92 MG/DL (ref 70–110)
POTASSIUM SERPL-SCNC: 3.5 MMOL/L
PROT SERPL-MCNC: 5.3 G/DL
RBC # BLD AUTO: 3.17 M/UL
SODIUM SERPL-SCNC: 142 MMOL/L
WBC # BLD AUTO: 7 K/UL

## 2018-09-26 PROCEDURE — 80053 COMPREHEN METABOLIC PANEL: CPT

## 2018-09-26 PROCEDURE — 25000003 PHARM REV CODE 250: Performed by: INTERNAL MEDICINE

## 2018-09-26 PROCEDURE — 94761 N-INVAS EAR/PLS OXIMETRY MLT: CPT

## 2018-09-26 PROCEDURE — 97535 SELF CARE MNGMENT TRAINING: CPT

## 2018-09-26 PROCEDURE — 27000221 HC OXYGEN, UP TO 24 HOURS

## 2018-09-26 PROCEDURE — 63600175 PHARM REV CODE 636 W HCPCS: Performed by: SURGERY

## 2018-09-26 PROCEDURE — 99239 HOSP IP/OBS DSCHRG MGMT >30: CPT | Mod: ,,, | Performed by: INTERNAL MEDICINE

## 2018-09-26 PROCEDURE — 97803 MED NUTRITION INDIV SUBSEQ: CPT

## 2018-09-26 PROCEDURE — C9113 INJ PANTOPRAZOLE SODIUM, VIA: HCPCS | Performed by: INTERNAL MEDICINE

## 2018-09-26 PROCEDURE — 36415 COLL VENOUS BLD VENIPUNCTURE: CPT

## 2018-09-26 PROCEDURE — 84100 ASSAY OF PHOSPHORUS: CPT

## 2018-09-26 PROCEDURE — 99900035 HC TECH TIME PER 15 MIN (STAT)

## 2018-09-26 PROCEDURE — 63600175 PHARM REV CODE 636 W HCPCS: Performed by: INTERNAL MEDICINE

## 2018-09-26 PROCEDURE — 85025 COMPLETE CBC W/AUTO DIFF WBC: CPT

## 2018-09-26 PROCEDURE — 94640 AIRWAY INHALATION TREATMENT: CPT

## 2018-09-26 PROCEDURE — 97110 THERAPEUTIC EXERCISES: CPT

## 2018-09-26 PROCEDURE — 83735 ASSAY OF MAGNESIUM: CPT

## 2018-09-26 RX ORDER — PANTOPRAZOLE SODIUM 40 MG/1
40 FOR SUSPENSION ORAL
Status: DISCONTINUED | OUTPATIENT
Start: 2018-09-27 | End: 2018-09-26 | Stop reason: HOSPADM

## 2018-09-26 RX ORDER — METOPROLOL TARTRATE 25 MG/1
25 TABLET, FILM COATED ORAL 2 TIMES DAILY
Qty: 60 TABLET | Refills: 0 | Status: SHIPPED | OUTPATIENT
Start: 2018-09-26 | End: 2019-03-01 | Stop reason: CLARIF

## 2018-09-26 RX ORDER — LEVOTHYROXINE SODIUM 175 UG/1
175 TABLET ORAL
Qty: 30 TABLET | Refills: 0 | Status: SHIPPED | OUTPATIENT
Start: 2018-09-27 | End: 2019-03-01 | Stop reason: CLARIF

## 2018-09-26 RX ADMIN — METOCLOPRAMIDE 10 MG: 5 INJECTION, SOLUTION INTRAMUSCULAR; INTRAVENOUS at 06:09

## 2018-09-26 RX ADMIN — EZETIMIBE 10 MG: 10 TABLET ORAL at 08:09

## 2018-09-26 RX ADMIN — METOCLOPRAMIDE 10 MG: 5 INJECTION, SOLUTION INTRAMUSCULAR; INTRAVENOUS at 01:09

## 2018-09-26 RX ADMIN — METOCLOPRAMIDE 10 MG: 5 INJECTION, SOLUTION INTRAMUSCULAR; INTRAVENOUS at 11:09

## 2018-09-26 RX ADMIN — SODIUM CHLORIDE: 0.9 INJECTION, SOLUTION INTRAVENOUS at 01:09

## 2018-09-26 RX ADMIN — OXYCODONE HYDROCHLORIDE 5 MG: 5 TABLET ORAL at 06:09

## 2018-09-26 RX ADMIN — SODIUM CHLORIDE: 0.9 INJECTION, SOLUTION INTRAVENOUS at 08:09

## 2018-09-26 RX ADMIN — LEVOTHYROXINE SODIUM 175 MCG: 125 TABLET ORAL at 06:09

## 2018-09-26 RX ADMIN — FLUTICASONE FUROATE AND VILANTEROL TRIFENATATE 1 PUFF: 100; 25 POWDER RESPIRATORY (INHALATION) at 07:09

## 2018-09-26 RX ADMIN — PANTOPRAZOLE SODIUM 40 MG: 40 INJECTION, POWDER, LYOPHILIZED, FOR SOLUTION INTRAVENOUS at 08:09

## 2018-09-26 RX ADMIN — ASPIRIN 325 MG: 325 TABLET, DELAYED RELEASE ORAL at 08:09

## 2018-09-26 NOTE — PT/OT/SLP PROGRESS
Physical Therapy Treatment    Patient Name:  Shazia Stacy   MRN:  [id number]    Recommendations:     Discharge Recommendations:  home health PT       Assessment:     Shazia Stacy is a 84 y.o. female admitted with a medical diagnosis of SBO (small bowel obstruction).  She presents with the following impairments/functional limitations:  weakness, impaired self care skills, impaired functional mobilty, impaired endurance, gait instability, impaired balance, decreased lower extremity function, decreased ROM, decreased safety awareness .    Rehab Prognosis:  good; patient would benefit from acute skilled PT services to address these deficits and reach maximum level of function.      Recent Surgery: * No surgery found *      Plan:     During this hospitalization, patient to be seen 6 x/week to address the above listed problems via gait training, therapeutic activities, therapeutic exercises  · Plan of Care Expires:  10/07/18   Plan of Care Reviewed with: patient, daughter    Subjective     Communicated with nurse Perez prior to session.  Patient found sitting EOB with OT upon PT entry to room, agreeable to treatment.      Chief Complaint: none expressed  Patient comments/goals: pt expressed that it feels good to bed out of bed  Pain/Comfort:  · Pain Rating 1: 0/10    Patients cultural, spiritual, Shinto conflicts given the current situation:      Objective:     Patient found with: peripheral IV, oxygen, telemetry     General Precautions: Standard, fall, vision impaired   Orthopedic Precautions:N/A   Braces: N/A     Functional Mobility:  · Transfers:     · Sit to Stand:  minimum assistance with rolling walker  · Gait: 12' from sink to chair with CGA using RW; O2 and IV attached        Therapeutic Activities and Exercises:   (PTA present and observed pt ambulating with OT from bed to sink to brush teeth)   PTA then assisted pt with gait, bed to chair transfer and therex    Seated BLE therex: AP, LAQ, marching. Hip abd/add  x 10 reps each      Patient left up in chair with all lines intact, call button in reach, nurse notified and daughter present..    GOALS:   Multidisciplinary Problems     Physical Therapy Goals        Problem: Physical Therapy Goal    Goal Priority Disciplines Outcome Goal Variances Interventions   Physical Therapy Goal     PT, PT/OT Ongoing (interventions implemented as appropriate)     Description:  Goals to be met by: 10/07/18     Patient will increase functional independence with mobility by performin. Supine to sit with Contact Guard Assistance  2. Bed to chair transfer with Minimal Assistance.  3. Gait  x 50 feet with Minimal Assistance using Rolling Walker.   4. Lower extremity exercise program x 15 reps, with assistance as needed                      Time Tracking:     PT Received On: 18  PT Start Time: 1015     PT Stop Time: 1026  PT Total Time (min): 11 min     Billable Minutes: Gait Training 3 and Therapeutic Exercise 8    Treatment Type: Treatment  PT/PTA: PTA     PTA Visit Number: 2     Stephanie Lyle, PTA  2018

## 2018-09-26 NOTE — PT/OT/SLP PROGRESS
Occupational Therapy   Treatment    Name: Shazia Stacy  MRN: [id number]  Admitting Diagnosis:  SBO (small bowel obstruction)       Recommendations:     Discharge Recommendations: home health PT  Discharge Equipment Recommendations:  none  Barriers to discharge:  None    Subjective     Communicated with: Ana nurse prior to session.  Pain/Comfort:  · Pain Rating 1: 0/10  · Pain Rating Post-Intervention 1: 0/10    Patients cultural, spiritual, Latter-day conflicts given the current situation:      Objective:     Patient found with: peripheral IV, oxygen, telemetry    General Precautions: Standard, fall, vision impaired   Orthopedic Precautions:N/A   Braces: N/A     Occupational Performance:    Bed Mobility:    · Patient completed Rolling/Turning to Right with stand by assistance and with side rail  · Patient completed Scooting/Bridging with stand by assistance  · Patient completed Supine to Sit with stand by assistance and with side rail     Functional Mobility/Transfers:  · Patient completed Sit <> Stand Transfer with minimum assistance  with  bed rail and hand held assistance   · Patient completed Toilet Transfer Stand Pivot technique with minimum assistance and cues for hand placement with  bedside commode  · Functional Mobility: Pt walked with 2L O2 at slow pace from bedside to sink & back to bedside chair using walker with Min A & no LOB noted.  · Pt was able to stand at the sink x 5 minutes with good tolerance on 2L O2.    Activities of Daily Living:  · Grooming: contact guard assistance, minimum assistance and extra time 2/2 impaired vision with difficulty locating faucet handles standing at sink  · Upper Body Dressing: minimum assistance to don/doff gown at EOB with assistance to find sleeves 2/2 low vision  · Toileting: moderate assistance for brief management in standing at [facility]    Patient left up in chair with all lines intact, call button in reach and daughter present    [facility] 6 Click:  [facility] Total Score:  15    Treatment & Education:  OT ed pt on bed mobility techniques to increase independence with task.  OT educated patient on bedside commode transfer techniques from EOB.  OT ed patient on safety with walker use for functional mobility with cues for hand placement & sequencing.   OT ed pt & her daughter on use of adequate lighting, keeping pathways clear of obstacles to prevent falls at home and while in hospital. Pt & her daughter verbalized understanding.    Education:    Assessment:     Shazia Stacy is a 84 y.o. female with a medical diagnosis of SBO (small bowel obstruction).  She presents with increased activity tolerance and decreased pain. Pt put forth good effort with all treatment activities.  Performance deficits affecting function are weakness, impaired self care skills, impaired balance, impaired skin, impaired endurance, impaired functional mobilty, decreased upper extremity function, decreased lower extremity function, gait instability, decreased coordination.      Rehab Prognosis:  Good; patient would benefit from acute skilled OT services to address these deficits and reach maximum level of function.       Plan:     Patient to be seen 3 x/week to address the above listed problems via self-care/home management, therapeutic activities, therapeutic exercises  · Plan of Care Expires: 10/24/18  · Plan of Care Reviewed with: patient, daughter    This Plan of care has been discussed with the patient who was involved in its development and understands and is in agreement with the identified goals and treatment plan    GOALS:   Multidisciplinary Problems     Occupational Therapy Goals        Problem: Occupational Therapy Goal    Goal Priority Disciplines Outcome Interventions   Occupational Therapy Goal     OT, PT/OT Ongoing (interventions implemented as appropriate)    Description:  Goals to be met by: 10/1/18     Patient will increase functional independence with ADLs by performing:    UE Dressing with  Set-up Assistance.  LE Dressing with Set-up Assistance, Minimal Assistance and Assistive Devices as needed.  Toileting from bedside commode with Stand-by Assistance and Assistive Devices as needed for hygiene and clothing management.   Toilet transfer to bedside commode with Stand-by Assistance.  Upper extremity exercise program x10 reps per handout, with supervision.                      Time Tracking:     OT Date of Treatment: 09/26/18  OT Start Time: 0940  OT Stop Time: 1014  OT Total Time (min): 34 min    Billable Minutes:Self Care/Home Management 34    RADHA Reddy  9/26/2018

## 2018-09-26 NOTE — CONSULTS
HISTORY OF PRESENT ILLNESS:  This 54-year-old patient was admitted basically for   abdominal pain, distention and some nausea and vomiting.  She was found to have   small-bowel obstruction.  She was placed on G-tube and managed conservatively.    She has improved, the NG tube is out.  She began to tolerate some clear fluids.    She was accidentally found to have episodes of fast heartbeat which mostly   appears to be atrial tachycardia, possible some episodes of AF.  The patient has   no known history of heart disease.  No prior history of anemia.  The patient's   daughter is at the bedside.    PAST MEDICAL HISTORY:  COPD, coronary artery disease, diabetes, GERD,   hyperlipidemia.    PAST SURGICAL HISTORY:  Status post abdominal surgery, appendectomy,   cholecystectomy and hernia repair.    ALLERGIES:  DEPAKOTE, LATEX.    HOME MEDICATIONS:  Include enalapril, aspirin, Cimzia, gabapentin,   levothyroxine, lorazepam, Protonix and simvastatin.    PHYSICAL EXAMINATION:  GENERAL:  Shows an elderly female patient in no acute distress.  VITAL SIGNS:  Temperature 98, pulse 78 per minute and regular, blood pressure   130/80.  HEENT:  Normocephalic and nonicteric.  NECK:  There is no obvious jugular venous distention or carotid bruit.  CARDIAC:  Regular rhythm.  No murmur or gallop.  LUNGS:  Revealed diminished breath sounds at right tibia  ABDOMEN:  Soft.  EXTREMITIES:  Leg exam deferred  EXTREMITIES:  No edema.    EKG shows sinus rhythm with baseline artifact, nonspecific ST-T changes.    LABORATORY:  WBC 6.7, hemoglobin 10.6.  Hematocrit 32.2 with normal indices,   platelet count 187.  Sodium 137, potassium 3.5, chloride 105, CO2 23, ____,   creatinine of 0.7, calcium 7.7, troponin 0.027.    IMPRESSION:  1.  Partial small bowel obstruction, relieved.  2.  Episodes of atrial tachycardia, probably acute focal atrial tachycardia.  3.  Hypertension by history.  4.  Diabetes.    PLAN:  The patient has a history of constipation.   She is not a good candidate   for Cardizem.  We therefore will use Beta-blocker, IV Lopressor 25 mg b.i.d., to   control arrhythmia.  ____ the patient's daughter.  Please give me a call for   any further questions.      RIAN  dd: 09/25/2018 13:17:41 (CDT)  td: 09/25/2018 20:41:35 (CDT)  Doc ID   #[id number]  Job ID #[id number]    CC: Josselyn Estes M.D.

## 2018-09-26 NOTE — PLAN OF CARE
Problem: Patient Care Overview  Goal: Plan of Care Review  02 saturation  99% on nc at 2lpm.  Patient refused aerosol tx.  Patient receiving Breo mdi x 1 puff now and qday.

## 2018-09-26 NOTE — PLAN OF CARE
Problem: Patient Care Overview  Goal: Plan of Care Review  Outcome: Ongoing (interventions implemented as appropriate)  Plan of care reviewed with patient and patient's daughter. Patient and daughter verbalized understanding. VSS/NADN. IV fluids infusing per orders. NC 2/L in use. Blood glucose monitored no coverage required. Tele monitor in use. PRN pain meds given with relief noted. Patient up to BSC with assist x 1. SR up x 2, bed in lowest position, call light in reach. Will continue to monitor.

## 2018-09-26 NOTE — PLAN OF CARE
Problem: Occupational Therapy Goal  Goal: Occupational Therapy Goal  Goals to be met by: 10/1/18     Patient will increase functional independence with ADLs by performing:    UE Dressing with Set-up Assistance.  LE Dressing with Set-up Assistance, Minimal Assistance and Assistive Devices as needed.  Toileting from bedside commode with Stand-by Assistance and Assistive Devices as needed for hygiene and clothing management.   Toilet transfer to bedside commode with Stand-by Assistance.  Upper extremity exercise program x10 reps per handout, with supervision.     Outcome: Ongoing (interventions implemented as appropriate)  Patient is making progress. Goals remain appropriate. Continue OT plan of care.  RADHA Feldman  9/26/2018

## 2018-09-26 NOTE — ASSESSMENT & PLAN NOTE
Contributing Nutrition Diagnosis  Altered lab values    Related to (etiology):   Altered absorption of nutrients    Signs and Symptoms (as evidenced by):   Elevated glucose    Interventions/Recommendations (treatment strategy):  1) DM diet restriction if glucose becomes elevated this admission    Nutrition Diagnosis Status:   New

## 2018-09-26 NOTE — PROGRESS NOTES
[facility]  Adult Nutrition  Progress Note    SUMMARY   Recommendations     Recommendation/Intervention:   1) Rec. Continue low fiber diet, + texture per SLP  2) If PO intakes drop to < 50% estimated needs by f/u Rec nutrition support  3) continue Boost Glucose Control, BID     Goals: 1) Initiate nutrition within 48-72 hrs 2) Pt will consume/receive >=85% EEN by discharge.   Nutrition Goal Status: 1) met 2) met  Communication of RD Recs: (plan of care, sticky note)     D/C planning: To be determined     Reason for Assessment     Reason for Assessment: RD follow up  Diagnosis: (SBO)  Relevant Medical History: DM, Chrohn's, Diverticulitis, GERD, HLD, COPD; pain pump     General Information Comments: Spoke with nsg; reports unable to determine placement of NGT, unable to give po meds, pt in a lot of pain. NPO for SBO with noted hiatal hernia with nearly all of stomach in chest per CT findings; surgery consulted. Unable to perform NFPE due to remote coverage, but BMI indicates limited lean body mass and fat stores. Prev weight adm indicate similar UBW except one date of 59.5 kg in 4/2018, but unable to speak with pt over phone to confirm this number. In setting of likely malnutrition and extended NPO status, will provide PPN recs and have on-site RD f/u with NFPE and progress.   9/24/18: Pt alert with daughter at bedside. Pt lives with daughter in her own area. Reports pt usually eats cereal or oatmeal for breakfast, around 10 am sweet potato with vegetables, around 1-2 peanut butter sandwich and occasionally eats out at [employer] or [employer].  Notes that pt does not like to eat past then as it affects her hiatal hernia and she is uncomfortable.  No indication that 59.5 kg is valid 2/2 pt's wt hx going back to 10/9/17 shows that pt's current wt is her UBW.  NFPE revealed significant fat/muscle loss. (in S&S in PES).  Pt has been NPO x 4 days, now advanced to clear.  9/26/18 Pt awake and alert. Diet  "advanced yesterday to mechanical soft (added low fiber restriction). Ptate 100% of breakfast today, she states she has a good appetite. Agreeable to supplements, added to diet order this morning. + BM.     Nutrition Risk Screen     Nutrition Risk Screen: no indicators present     Nutrition/Diet History     Food Preferences: BHAVANA  Do you have any cultural, spiritual, Yazdanism conflicts, given your current situation?: Christianity, no blood  Food Allergies: (noted latex (occassionally foods restricted with latex))  Factors Affecting Nutritional Intake: None at this time     Anthropometrics     Temp: 97.5 °F (36.4 °C)  Height Method: Stated  Height: 5' 6" (167.6 cm)  Height (inches): 66 in  Weight Method: Bed Scale  Weight: 47.2 kg   Weight (lb): 104.5 lb 9/25  Ideal Body Weight (IBW), Female: 130 lb  % Ideal Body Weight, Female (lb): 80.38 lb  BMI (Calculated): 16.9  BMI Grade: 16 - 16.9 protein-energy malnutrition grade II  Weight Loss: (unsure of accuracy of 59.5 kg weight on 4/2018)        Lab/Procedures/Meds     Pertinent Labs Reviewed: reviewed  Lab Results   Component Value Date    ALBUMIN 2.5 (L) 09/26/2018     BMP  Lab Results   Component Value Date     09/26/2018    K 3.5 09/26/2018     (H) 09/26/2018    CO2 22 (L) 09/26/2018    BUN 14 09/26/2018    CREATININE 0.8 09/26/2018    CALCIUM 7.7 (L) 09/26/2018    ANIONGAP 8 09/26/2018    ESTGFRAFRICA >60 09/26/2018    EGFRNONAA >60 09/26/2018         POCT Glucose   Date Value Ref Range Status   09/26/2018 92 70 - 110 mg/dL Final   09/26/2018 91 70 - 110 mg/dL Final   09/25/2018 82 70 - 110 mg/dL Final   09/25/2018 146 (H) 70 - 110 mg/dL Final   09/25/2018 74 70 - 110 mg/dL Final   09/25/2018 106 70 - 110 mg/dL Final   09/25/2018 71 70 - 110 mg/dL Final   09/24/2018 97 70 - 110 mg/dL Final   09/24/2018 77 70 - 110 mg/dL Final   09/24/2018 68 (L) 70 - 110 mg/dL Final   09/24/2018 108 70 - 110 mg/dL Final   09/24/2018 63 (L) 70 - 110 mg/dL Final "   09/23/2018 79 70 - 110 mg/dL Final   09/23/2018 88 70 - 110 mg/dL Final     Lab Results   Component Value Date    WBC 7.00 09/26/2018       Pertinent Medications Reviewed: reviewed  Pertinent Medications Comments: Vit C, statin, NS @ 100 ml/hr, prochlorperazine     Physical Findings/Assessment     Overall Physical Appearance: (BHAVANA; BMI indicates underweight/malnutrition)  Tubes: nasogastric tube  Oral/Mouth Cavity: WDL  Skin: intact Дмитрий = 15     Estimated/Assessed Needs     Weight Used For Calorie Calculations: 47.4 kg (104 lb 8 oz)  Energy Calorie Requirements (kcal): 1175 (RMR x 1.25; can address weight repletion after initial clinical issues stabilized)  Energy Need Method: Iron-St Jeor  Protein Requirements: 57-66 g (1.2-1.4g/kg)  Weight Used For Protein Calculations: 47.4 kg (104 lb 8 oz)     Fluid Need Method: RDA Method  RDA Method (mL): 1175  CHO Requirement: 150g        Nutrition Prescription Ordered     Current Diet Order: Low fiber/mechanical soft     Evaluation of Received Nutrient/Fluid Intake     IV Fluid (mL): NS @ 100 ml/hr     Energy Calories Required: Meeting needs  Protein Required: Meeting needs  Fluid Required: (per MD)  Comments: + BM today     % Meal Intake: 100%     Nutrition Risk     Level of Risk/Frequency of Follow-up: (2 x week)      Assessment and Plan      Severe malnutrition    Contributing Nutrition Diagnosis  Severe malnutrition in context of acute on chronic illness    Related to (etiology):   Altered GI function 2/2 SBO with hx of Crohns disease and diveriticulosis    Signs and Symptoms (as evidenced by):   1) Severe fat/muscle depletion noted around temples, eyes, depressions on dorsal area of hand, protruding clavicle, and in upper arm  2) Debility, decreased ability to perform ADLs per PT note and nursing functional screen  3) Underweight with BMI 16.9    Interventions/Recommendations (treatment strategy):  1) Consider PPN until pt is able to tolerate >=50% EEN PO. 2)  Progress per pt tolerance to low fiber diet with nutrition supplement, BID    Nutrition Diagnosis Status:   Continues          Non-insulin dependent type 2 diabetes mellitus    Contributing Nutrition Diagnosis  Altered lab values    Related to (etiology):   Altered absorption of nutrients    Signs and Symptoms (as evidenced by):   Elevated glucose    Interventions/Recommendations (treatment strategy):  1) DM diet restriction if glucose becomes elevated this admission    Nutrition Diagnosis Status:   New         Inadequate calorie/protein intake for undetermined time frame  r/t inability to tolerate foods throughout the day as evidenced pt statement of intolerance of intolerance of foods later in the afternoons and continued low BMI since 10/9/17.   Improving      Nutrition Problem  Altered GI Function     Related to (etiology):   SBO; Hiatal hernia     Signs and Symptoms (as evidenced by):   NPO     Interventions/Recommendations (treatment strategy):  See recs     Nutrition Diagnosis Status:   Resolving     Monitor and Evaluation     Food and Nutrient Intake: energy intake, parenteral nutrition intake  Food and Nutrient Adminstration: diet order, enteral and parenteral nutrition administration  Knowledge/Beliefs/Attitudes: food and nutrition knowledge/skill  Physical Activity and Function: nutrition-related ADLs and IADLs  Anthropometric Measurements: weight, weight change  Biochemical Data, Medical Tests and Procedures: electrolyte and renal panel, glucose/endocrine profile  Nutrition-Focused Physical Findings: overall appearance      Nutrition Follow-Up     RD Follow-up?: Yes

## 2018-09-26 NOTE — PLAN OF CARE
Problem: Patient Care Overview  Goal: Plan of Care Review  Outcome: Outcome(s) achieved Date Met: 09/26/18  Safety was maintained. Discharge instructions reviewed with pt and daughter, both verb complete understanding. Pt daughter packed belongings per self. IV removed per order, all instructions and scripts given to daughter.

## 2018-09-26 NOTE — PROGRESS NOTES
Pharmacist Intervention IV to PO Note    Shazia Stacy is a 84 y.o. female being treated with IV medication pantoprazole    Patient Data:    Vital Signs (Most Recent):  Temp: 97.4 °F (36.3 °C) (09/26/18 1155)  Pulse: 63 (09/26/18 1155)  Resp: 18 (09/26/18 1155)  BP: (!) 110/54 (09/26/18 1155)  SpO2: 98 % (09/26/18 1155)   Vital Signs (72h Range):  Temp:  [96.2 °F (35.7 °C)-98.7 °F (37.1 °C)]   Pulse:  []   Resp:  [16-20]   BP: ()/(54-96)   SpO2:  [95 %-99 %]      CBC:  Recent Labs   Lab  09/24/18   0533  09/25/18   0612  09/26/18   0624   WBC  5.80  6.70  7.00   RBC  3.28*  3.51*  3.17*   HGB  9.9*  10.6*  9.5*   HCT  30.7*  32.2*  29.4*   PLT  167  187  179   MCV  94  92  93   MCH  30.1  30.3  30.1   MCHC  32.2  33.0  32.5     CMP:     Recent Labs   Lab  09/24/18   0533  09/25/18   0612  09/26/18   0522   GLU  66*  87  89   CALCIUM  7.5*  7.7*  7.7*   ALBUMIN  2.8*  2.8*  2.5*   PROT  5.9*  5.8*  5.3*   NA  141  137  142   K  3.4*  3.5  3.5   CO2  19*  23  22*   CL  110  105  112*   BUN  19  15  14   CREATININE  0.7  0.7  0.8   ALKPHOS  83  86  85   ALT  12  11  12   AST  19  17  23   BILITOT  0.4  0.5  0.3       Dietary Orders:  Diet Orders            Diet Dysphagia Mechanical Soft (IDDSI Level 5) [facility]; Low Fiber: Dysphagia 2 (Mechanical Soft Ground) starting at 09/26 0831            Based on the following criteria, this patient qualifies for intravenous to oral conversion:  [x] The patients gastrointestinal tract is functioning (tolerating medications via oral or enteral route for 24 hours and tolerating food or enteral feeds for 24 hours).  [x] The patient is hemodynamically stable for 24 hours (heart rate <100 beats per minute, systolic blood pressure >99 mm Hg, and respiratory rate <20 breaths per minute).      IV medication pantoprazole 40mg daily will be changed to oral medication pantoprazole 40mg daily     Pharmacist's Name: Liv Acevedo

## 2018-09-26 NOTE — DISCHARGE SUMMARY
"Discharge Summary  Hospital Medicine    Admit Date: 9/21/2018    Date and Time: 9/26/20182:05 PM    Discharge Attending Physician: Josselyn Estes MD    Primary Care Physician: Nikhil Albrecht MD    Diagnoses:  Active Hospital Problems    Diagnosis  POA    *SBO (small bowel obstruction) [K56.609] - resolved  Transient atrial tachycardia  Yes    Encounter for nasogastric (NG) tube placement [Z46.59]  Not Applicable    Opioid dependence [F11.20]  Yes    COPD (chronic obstructive pulmonary disease) [J44.9]  Yes    Severe malnutrition [E43]  Yes    Debility [R53.81]  Yes    Hypothyroidism [E03.9]  Yes    Non-insulin dependent type 2 diabetes mellitus [E11.9]  Yes    Chronic respiratory failure [J96.10]  Yes    Crohn's disease without complication [K50.90]  Yes    Anemia [D64.9]  Yes    Small bowel obstruction [K56.609]  Yes        Discharged Condition: Good    Hospital Course:   Shazia Stacy is a 84 y.o. female with PMHx of DM, thyroid disease, diverticulitis, Crohn's disease, HLD, and COPD who presented to the ED with complaints of abdominal pain associated with N/V which began suddenly about 2 hours PTA. Per daughter, the patient has had regular bowel movements. Denied fever, chest pain, headache, and dysuria.  Patient was administered "a little bit of Zofran" earlier with no improvements to nausea. Daughter reported that pt has a hx of SBO about 3 years ago.  A CT abd/pelvis was performed; pt could not tolerate po contrast; however, CT scan was performed with IV dye indicating small-bowel obstruction without free air. Upon my assessment, pt is actively vomiting and refusing NGT placement. Patient was admitted to Hospitalist medicine service. Patient was evaluated by Dr. Montenegro. SBO was conservatively managed with NGT and LIS. Symptoms improved. Patient is tolerating diet and having bowel movements. Patient was noted to have atrial tachycardia which was managed by Dr. Fraga with use of BB. Symptoms improved. " Patient was discharged home in stable condition with following discharge plan of care. Total time with the patient was 30 minutes and greater than 50% was spent in counseling and coordination of care. The assessment and plan have been discussed at length. Physicians' notes reviewed. Labs and procedure reviewed.     Consults: Dr. Fraga, Dr. Montenegro    Significant Diagnostic Studies:   CT abdomen with contrast:  1. Small bowel obstruction.  A discrete transition point is not identified.  2. Large hiatal hernia with intrathoracic stomach.  3. Small volume right pleural effusion and ascites.  4. Unchanged pulmonary nodules.     KUB: 1. Partially imaged hiatal hernia.  2. Small bowel obstruction.     CXR: 1. Large hiatal hernia with intrathoracic stomach.  2. Gastric tube is coiled in the upper esophagus with tip in the stomach.  Correlate for desired position.     ECHO:  · The left ventricle cavity is normal.  · Left ventricle shows borderline hypertrophy.  · Left ventricle ejection fraction is normal at 69%  · Grade I (mild) left ventricular diastolic dysfunction consistent with impaired relaxation.  · LA pressure is normal.  · Left atrium is severely dilated.  · Mitral valve shows mild regurgitation.  · Normal central venous pressure (3 mm Hg).  · The estimated PA systolic pressure is 23.61 mm Hg  Microbiology Results (last 7 days)     ** No results found for the last 168 hours. **        Special Treatments/Procedures: None  Disposition: Home or Self Care    Medications:  Reconciled Home Medications:   Discharge Medication List as of 9/26/2018  2:32 PM      START taking these medications    Details   metoprolol tartrate (LOPRESSOR) 25 MG tablet Take 1 tablet (25 mg total) by mouth 2 (two) times daily., Starting Wed 9/26/2018, Until Thu 9/26/2019, Print         CONTINUE these medications which have CHANGED    Details   levothyroxine (SYNTHROID, LEVOTHROID) 175 MCG tablet Take 1 tablet (175 mcg total) by mouth before  breakfast., Starting Thu 9/27/2018, Until Fri 9/27/2019, Print         CONTINUE these medications which have NOT CHANGED    Details   enalapril (VASOTEC) 5 MG tablet Take 5 mg by mouth once daily. , Historical Med      aspirin (ECOTRIN) 325 MG EC tablet Take 1 tablet (325 mg total) by mouth 2 (two) times daily., Starting Fri 4/6/2018, Until Sat 4/6/2019, OTC      CIMZIA 400 mg/2 mL (200 mg/mL x 2) SyKt kit Starting Thu 8/9/2018, Historical Med      fluticasone-vilanterol (BREO ELLIPTA) 100-25 mcg/dose diskus inhaler Inhale 1 puff into the lungs once daily., Until Discontinued, Historical Med      gabapentin (NEURONTIN) 300 MG capsule Take 300 mg by mouth every evening. , Historical Med      HYDROmorphone (DILAUDID) 4 MG tablet Starting Mon 7/30/2018, Historical Med      LORazepam (ATIVAN) 1 MG tablet Starting Thu 8/9/2018, Historical Med      ondansetron (ZOFRAN-ODT) 8 MG TbDL Take 8 mg by mouth every 12 (twelve) hours as needed (nausea / vomiting)., Historical Med      pantoprazole (PROTONIX) 40 MG tablet Take 40 mg by mouth once daily. , Starting 10/15/2013, Until Discontinued, Historical Med      quetiapine (SEROQUEL XR) 200 MG Tb24 Take 200 mg by mouth every evening. , Historical Med      simvastatin (ZOCOR) 40 MG tablet Take 40 mg by mouth every evening. , Starting Thu 12/5/2013, Historical Med      temazepam (RESTORIL) 30 mg capsule Take 30 mg by mouth every evening. , Historical Med      ZETIA 10 mg tablet Take 10 mg by mouth once daily. , Starting Thu 12/5/2013, Historical Med         STOP taking these medications       ascorbic acid, vitamin C, (VITAMIN C) 500 MG tablet Comments:   Reason for Stopping:             Discharge Procedure Orders   Diet Cardiac   Order Comments: Boost can with meals     Other restrictions (specify):   Order Comments: PLEASE OBSERVE FALL PRECAUTIONS     Call MD for:   Order Comments: For worsening symptoms, chest pain, shortness of breath, increased abdominal pain, high grade  fever, stroke or stroke like symptoms, immediately go to the nearest Emergency Room or call 911 as soon as possible.     Follow-up Information     Nikhil Albrecht MD In 1 week.    Specialty:  Internal Medicine  Contact information:  [address] [facility] [address]  [place] [address]  [phone]             Jossie Fraga MD In 2 weeks.    Specialty:  Cardiology  Contact information:  [address]  [place] [address]  [phone]

## 2018-09-26 NOTE — PLAN OF CARE
Spoke with Dr Fraga regarding the pt's discharge; per the monitor tech the pt's HR went into the 40's overnight. He states as long as she was sleeping and asymptomatic she is clear to be discharged.  I spoke with the pt's daughter, Sepideh at the bedside regarding the pt's FU appt with PCP and need for home health; Sepideh declines the need for home health and will schedule the pt's PCP FU appt herself to make sure her  will be able to bring the pt......ALEC Hidalgo       09/26/18 114   Discharge Reassessment   Assessment Type Discharge Planning Reassessment

## 2018-09-26 NOTE — PLAN OF CARE
Problem: Physical Therapy Goal  Goal: Physical Therapy Goal  Goals to be met by: 10/07/18     Patient will increase functional independence with mobility by performin. Supine to sit with Contact Guard Assistance  2. Bed to chair transfer with Minimal Assistance.  3. Gait  x 50 feet with Minimal Assistance using Rolling Walker.   4. Lower extremity exercise program x 15 reps, with assistance as needed     Outcome: Ongoing (interventions implemented as appropriate)  PT for gait training, OOB to chair transfer and therex

## 2018-09-26 NOTE — PLAN OF CARE
Problem: Nutrition, Imbalanced: Inadequate Oral Intake (Adult)  Intervention: Promote/Optimize Nutrition  Recommendation/Intervention:   1) Rec. Continue low fiber diet, + texture per SLP  2) If PO intakes drop to < 50% estimated needs by f/u Rec nutrition support  3) continue Boost Glucose Control, BID     Goals: 1) Initiate nutrition within 48-72 hrs 2) Pt will consume/receive >=85% EEN by discharge.   Nutrition Goal Status: 1) met 2) met  Communication of RD Recs: (plan of care, sticky note)

## 2018-09-27 NOTE — PLAN OF CARE
09/27/18 0740   Final Note   Assessment Type Final Discharge Note   Discharge Disposition Home

## 2018-09-28 NOTE — PHYSICIAN QUERY
PT Name: Shazia Stacy  MR #: [id number]    Physician Query Form - Consultant Diagnosis Clarification     CDS/: Do Duff RN               Contact information:[email or web address]  This form is a permanent document in the medical record.     Query Date: September 28, 2018      By submitting this query, we are merely seeking further clarification of documentation.  Please utilize your independent clinical judgment when addressing the question(s) below.      The Medical record contains the following:   Diagnosis Supporting Clinical Information Location in Medical Record   Partial SBO Reason for consult: PSBO  Thin nonprotruberent abdomen.  BS present but faint  CT scan personally reviewed by me large hiatal hernia with intrathoracic stomach. Significant stool in colon consistent with constipation.  Dilated loops of small bowel noted. Admitted to hospital overnight with PSBO    A/P: Resolved PSBO  Start clears and adv as tolerated.      Active Hospital problems: SBO   General surgery consult        General surgery PN 9/24    discharge summary         Do you agree with the Consultants diagnosis of ___Partial SBO____?                 [  x ]   Yes               [   ]   Yes, but it resolved prior to my assessment of the patient               [   ]   No                [   ]   Clinically insignificant               [   ]   Clinically undetermined               [   ]   Other/Clarification of findings: ___________________________________________

## 2018-12-20 ENCOUNTER — OFFICE VISIT (OUTPATIENT)
Dept: ORTHOPEDICS | Facility: CLINIC | Age: 83
End: 2018-12-20
Payer: MEDICARE

## 2018-12-20 VITALS
BODY MASS INDEX: 16.71 KG/M2 | WEIGHT: 104 LBS | SYSTOLIC BLOOD PRESSURE: 152 MMHG | DIASTOLIC BLOOD PRESSURE: 66 MMHG | HEART RATE: 59 BPM | HEIGHT: 66 IN

## 2018-12-20 DIAGNOSIS — M19.012 GLENOHUMERAL ARTHRITIS, LEFT: ICD-10-CM

## 2018-12-20 DIAGNOSIS — Z96.642 HISTORY OF TOTAL HIP ARTHROPLASTY, LEFT: Primary | ICD-10-CM

## 2018-12-20 PROCEDURE — 3288F FALL RISK ASSESSMENT DOCD: CPT | Mod: ,,, | Performed by: ORTHOPAEDIC SURGERY

## 2018-12-20 PROCEDURE — 99213 OFFICE O/P EST LOW 20 MIN: CPT | Mod: 25,,, | Performed by: ORTHOPAEDIC SURGERY

## 2018-12-20 PROCEDURE — 73060 X-RAY EXAM OF HUMERUS: CPT | Mod: LT,,, | Performed by: ORTHOPAEDIC SURGERY

## 2018-12-20 PROCEDURE — 1100F PTFALLS ASSESS-DOCD GE2>/YR: CPT | Mod: ,,, | Performed by: ORTHOPAEDIC SURGERY

## 2018-12-20 PROCEDURE — 72170 X-RAY EXAM OF PELVIS: CPT | Mod: ,,, | Performed by: ORTHOPAEDIC SURGERY

## 2018-12-20 PROCEDURE — 3077F SYST BP >= 140 MM HG: CPT | Mod: ,,, | Performed by: ORTHOPAEDIC SURGERY

## 2018-12-20 PROCEDURE — 3078F DIAST BP <80 MM HG: CPT | Mod: ,,, | Performed by: ORTHOPAEDIC SURGERY

## 2018-12-20 PROCEDURE — 20610 DRAIN/INJ JOINT/BURSA W/O US: CPT | Mod: LT,,, | Performed by: ORTHOPAEDIC SURGERY

## 2018-12-20 RX ORDER — METHYLPREDNISOLONE ACETATE 40 MG/ML
40 INJECTION, SUSPENSION INTRA-ARTICULAR; INTRALESIONAL; INTRAMUSCULAR; SOFT TISSUE
Status: DISCONTINUED | OUTPATIENT
Start: 2018-12-20 | End: 2018-12-20 | Stop reason: HOSPADM

## 2018-12-20 RX ORDER — HYDROMORPHONE HYDROCHLORIDE 2 MG/1
TABLET ORAL
Refills: 0 | COMMUNITY
Start: 2018-11-08

## 2018-12-20 RX ADMIN — METHYLPREDNISOLONE ACETATE 40 MG: 40 INJECTION, SUSPENSION INTRA-ARTICULAR; INTRALESIONAL; INTRAMUSCULAR; SOFT TISSUE at 01:12
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
## 2018-12-20 NOTE — PROCEDURES
Large Joint Aspiration/Injection: L glenohumeral  Date/Time: 12/20/2018 1:38 PM  Performed by: Jamie Conn MD  Authorized by: Jamie Conn MD     Consent Done?:  Yes (Verbal)  Indications:  Pain  Procedure site marked: Yes    Timeout: Prior to procedure the correct patient, procedure, and site was verified      Location:  Shoulder  Site:  L glenohumeral  Prep: Patient was prepped and draped in usual sterile fashion    Needle size:  25 G  Medications:  40 mg methylPREDNISolone acetate 40 mg/mL; 40 mg methylPREDNISolone acetate 40 mg/mL  Patient tolerance:  Patient tolerated the procedure well with no immediate complications

## 2019-01-20 ENCOUNTER — OUTSIDE PLACE OF SERVICE (OUTPATIENT)
Dept: PULMONOLOGY | Facility: CLINIC | Age: 84
End: 2019-01-20
Payer: MEDICARE

## 2019-01-20 PROCEDURE — 99223 PR INITIAL HOSPITAL CARE,LEVL III: ICD-10-PCS | Mod: S$GLB,,, | Performed by: INTERNAL MEDICINE

## 2019-01-20 PROCEDURE — 99223 1ST HOSP IP/OBS HIGH 75: CPT | Mod: S$GLB,,, | Performed by: INTERNAL MEDICINE

## 2019-01-21 ENCOUNTER — HISTORICAL (OUTPATIENT)
Dept: ADMINISTRATIVE | Facility: HOSPITAL | Age: 84
End: 2019-01-21

## 2019-01-22 LAB
BASOPHILS NFR BLD: 0 K/UL (ref 0–0.2)
BASOPHILS NFR BLD: 0.2 %
EOSINOPHIL NFR BLD: 0.1 K/UL (ref 0–0.7)
EOSINOPHIL NFR BLD: 1.2 %
ERYTHROCYTE [DISTWIDTH] IN BLOOD BY AUTOMATED COUNT: 15.2 % (ref 11.7–14.9)
GRAN #: 8.2 K/UL (ref 1.4–6.5)
GRAN%: 79.1 %
HCT VFR BLD AUTO: 37.4 % (ref 36–48)
HGB BLD-MCNC: 11.3 G/DL (ref 12–15)
IMMATURE GRANS (ABS): 0.1 K/UL (ref 0–1)
IMMATURE GRANULOCYTES: 0.8 %
IMMATURE PLATELET FRACTION: 2.6 % (ref 0.5–7.5)
LYMPH #: 1.3 K/UL (ref 1.2–3.4)
LYMPH%: 12.3 %
MCH RBC QN AUTO: 30.3 PG (ref 25–35)
MCHC RBC AUTO-ENTMCNC: 30.2 G/DL (ref 31–36)
MCV RBC AUTO: 100.3 FL (ref 79–98)
MONO #: 0.7 K/UL (ref 0.1–0.6)
MONO%: 6.4 %
NUCLEATED RBCS: 0 %
PLATELET # BLD AUTO: 181 K/UL (ref 140–440)
PMV BLD AUTO: 10.9 FL (ref 8.8–12.7)
RBC # BLD AUTO: 3.73 M/UL (ref 3.5–5.5)
WBC # BLD AUTO: 10.4 K/UL (ref 5–10)

## 2019-03-01 ENCOUNTER — HOSPITAL ENCOUNTER (INPATIENT)
Facility: HOSPITAL | Age: 84
LOS: 5 days | Discharge: HOME HEALTH | DRG: 177 | End: 2019-03-06
Attending: EMERGENCY MEDICINE | Admitting: HOSPITALIST
Payer: MEDICARE

## 2019-03-01 DIAGNOSIS — J18.9 RLL PNEUMONIA: Primary | ICD-10-CM

## 2019-03-01 DIAGNOSIS — K44.9 HIATAL HERNIA: ICD-10-CM

## 2019-03-01 PROBLEM — L89.159 SACRAL DECUBITUS ULCER: Status: ACTIVE | Noted: 2019-03-01

## 2019-03-01 LAB
ALBUMIN SERPL BCP-MCNC: 2.5 G/DL
ALP SERPL-CCNC: 85 U/L
ALT SERPL W/O P-5'-P-CCNC: 14 U/L
ANION GAP SERPL CALC-SCNC: 8 MMOL/L
AST SERPL-CCNC: 30 U/L
BASOPHILS # BLD AUTO: 0 K/UL
BASOPHILS NFR BLD: 0.1 %
BILIRUB SERPL-MCNC: 0.3 MG/DL
BILIRUB UR QL STRIP: NEGATIVE
BUN SERPL-MCNC: 16 MG/DL
CALCIUM SERPL-MCNC: 7.6 MG/DL
CHLORIDE SERPL-SCNC: 101 MMOL/L
CLARITY UR: CLEAR
CO2 SERPL-SCNC: 26 MMOL/L
COLOR UR: YELLOW
CREAT SERPL-MCNC: 1 MG/DL
DIFFERENTIAL METHOD: ABNORMAL
EOSINOPHIL # BLD AUTO: 0 K/UL
EOSINOPHIL NFR BLD: 0.3 %
ERYTHROCYTE [DISTWIDTH] IN BLOOD BY AUTOMATED COUNT: 15.6 %
EST. GFR  (AFRICAN AMERICAN): 60 ML/MIN/1.73 M^2
EST. GFR  (NON AFRICAN AMERICAN): 52 ML/MIN/1.73 M^2
GLUCOSE SERPL-MCNC: 143 MG/DL
GLUCOSE UR QL STRIP: NEGATIVE
HCT VFR BLD AUTO: 28.8 %
HGB BLD-MCNC: 9.2 G/DL
HGB UR QL STRIP: NEGATIVE
INFLUENZA A, MOLECULAR: NEGATIVE
INFLUENZA B, MOLECULAR: NEGATIVE
KETONES UR QL STRIP: NEGATIVE
LACTATE SERPL-SCNC: 1.1 MMOL/L
LEUKOCYTE ESTERASE UR QL STRIP: NEGATIVE
LYMPHOCYTES # BLD AUTO: 1.5 K/UL
LYMPHOCYTES NFR BLD: 16.4 %
MCH RBC QN AUTO: 29.3 PG
MCHC RBC AUTO-ENTMCNC: 32 G/DL
MCV RBC AUTO: 92 FL
MONOCYTES # BLD AUTO: 0.5 K/UL
MONOCYTES NFR BLD: 5.2 %
NEUTROPHILS # BLD AUTO: 7.3 K/UL
NEUTROPHILS NFR BLD: 78 %
NITRITE UR QL STRIP: NEGATIVE
PH UR STRIP: 7 [PH] (ref 5–8)
PLATELET # BLD AUTO: 179 K/UL
PMV BLD AUTO: 8.8 FL
POTASSIUM SERPL-SCNC: 4.3 MMOL/L
PROT SERPL-MCNC: 6.4 G/DL
PROT UR QL STRIP: NEGATIVE
RBC # BLD AUTO: 3.15 M/UL
SODIUM SERPL-SCNC: 135 MMOL/L
SP GR UR STRIP: 1.01 (ref 1–1.03)
SPECIMEN SOURCE: NORMAL
URN SPEC COLLECT METH UR: NORMAL
UROBILINOGEN UR STRIP-ACNC: NEGATIVE EU/DL
WBC # BLD AUTO: 9.4 K/UL

## 2019-03-01 PROCEDURE — 85025 COMPLETE CBC W/AUTO DIFF WBC: CPT

## 2019-03-01 PROCEDURE — 87502 INFLUENZA DNA AMP PROBE: CPT

## 2019-03-01 PROCEDURE — 99285 EMERGENCY DEPT VISIT HI MDM: CPT | Mod: 25

## 2019-03-01 PROCEDURE — 12000002 HC ACUTE/MED SURGE SEMI-PRIVATE ROOM

## 2019-03-01 PROCEDURE — 81003 URINALYSIS AUTO W/O SCOPE: CPT

## 2019-03-01 PROCEDURE — 63600175 PHARM REV CODE 636 W HCPCS: Performed by: EMERGENCY MEDICINE

## 2019-03-01 PROCEDURE — 83605 ASSAY OF LACTIC ACID: CPT

## 2019-03-01 PROCEDURE — 80053 COMPREHEN METABOLIC PANEL: CPT

## 2019-03-01 PROCEDURE — 63600175 PHARM REV CODE 636 W HCPCS: Performed by: NURSE PRACTITIONER

## 2019-03-01 PROCEDURE — 36415 COLL VENOUS BLD VENIPUNCTURE: CPT

## 2019-03-01 PROCEDURE — 96375 TX/PRO/DX INJ NEW DRUG ADDON: CPT

## 2019-03-01 PROCEDURE — S0073 INJECTION, AZTREONAM, 500 MG: HCPCS | Performed by: EMERGENCY MEDICINE

## 2019-03-01 PROCEDURE — P9612 CATHETERIZE FOR URINE SPEC: HCPCS

## 2019-03-01 PROCEDURE — 96374 THER/PROPH/DIAG INJ IV PUSH: CPT

## 2019-03-01 PROCEDURE — 87040 BLOOD CULTURE FOR BACTERIA: CPT

## 2019-03-01 PROCEDURE — 25000003 PHARM REV CODE 250: Performed by: EMERGENCY MEDICINE

## 2019-03-01 RX ORDER — SODIUM CHLORIDE 0.9 % (FLUSH) 0.9 %
5 SYRINGE (ML) INJECTION
Status: DISCONTINUED | OUTPATIENT
Start: 2019-03-01 | End: 2019-03-06 | Stop reason: HOSPADM

## 2019-03-01 RX ORDER — RAMELTEON 8 MG/1
8 TABLET ORAL NIGHTLY PRN
Status: DISCONTINUED | OUTPATIENT
Start: 2019-03-01 | End: 2019-03-06 | Stop reason: HOSPADM

## 2019-03-01 RX ORDER — ACETAMINOPHEN 325 MG/1
650 TABLET ORAL EVERY 4 HOURS PRN
Status: DISCONTINUED | OUTPATIENT
Start: 2019-03-01 | End: 2019-03-06 | Stop reason: HOSPADM

## 2019-03-01 RX ORDER — AMOXICILLIN 250 MG
1 CAPSULE ORAL 2 TIMES DAILY PRN
Status: DISCONTINUED | OUTPATIENT
Start: 2019-03-01 | End: 2019-03-06 | Stop reason: HOSPADM

## 2019-03-01 RX ORDER — VANCOMYCIN HCL IN 5 % DEXTROSE 1G/250ML
20 PLASTIC BAG, INJECTION (ML) INTRAVENOUS
Status: COMPLETED | OUTPATIENT
Start: 2019-03-01 | End: 2019-03-01

## 2019-03-01 RX ORDER — ONDANSETRON 2 MG/ML
4 INJECTION INTRAMUSCULAR; INTRAVENOUS EVERY 4 HOURS PRN
Status: DISCONTINUED | OUTPATIENT
Start: 2019-03-01 | End: 2019-03-06 | Stop reason: HOSPADM

## 2019-03-01 RX ORDER — ENOXAPARIN SODIUM 100 MG/ML
40 INJECTION SUBCUTANEOUS EVERY 24 HOURS
Status: DISCONTINUED | OUTPATIENT
Start: 2019-03-01 | End: 2019-03-06 | Stop reason: HOSPADM

## 2019-03-01 RX ORDER — LEVOTHYROXINE SODIUM 112 UG/1
112 TABLET ORAL
COMMUNITY

## 2019-03-01 RX ORDER — IPRATROPIUM BROMIDE AND ALBUTEROL SULFATE 2.5; .5 MG/3ML; MG/3ML
3 SOLUTION RESPIRATORY (INHALATION) EVERY 4 HOURS PRN
Status: DISCONTINUED | OUTPATIENT
Start: 2019-03-01 | End: 2019-03-06 | Stop reason: HOSPADM

## 2019-03-01 RX ADMIN — VANCOMYCIN HYDROCHLORIDE 1000 MG: 1 INJECTION, POWDER, FOR SOLUTION INTRAVENOUS at 09:03

## 2019-03-01 RX ADMIN — Medication 500 MG: at 09:03

## 2019-03-01 RX ADMIN — Medication 2000 MG: at 09:03

## 2019-03-01 RX ADMIN — ENOXAPARIN SODIUM 40 MG: 100 INJECTION SUBCUTANEOUS at 11:03

## 2019-03-02 PROBLEM — L89.153 PRESSURE INJURY OF SACRAL REGION, STAGE 3: Status: ACTIVE | Noted: 2019-03-01

## 2019-03-02 LAB
ANION GAP SERPL CALC-SCNC: 6 MMOL/L
BASOPHILS # BLD AUTO: 0 K/UL
BASOPHILS NFR BLD: 0.4 %
BUN SERPL-MCNC: 15 MG/DL
CALCIUM SERPL-MCNC: 7.4 MG/DL
CHLORIDE SERPL-SCNC: 104 MMOL/L
CO2 SERPL-SCNC: 26 MMOL/L
CREAT SERPL-MCNC: 0.9 MG/DL
DIFFERENTIAL METHOD: ABNORMAL
EOSINOPHIL # BLD AUTO: 0.2 K/UL
EOSINOPHIL NFR BLD: 2.4 %
ERYTHROCYTE [DISTWIDTH] IN BLOOD BY AUTOMATED COUNT: 15.3 %
EST. GFR  (AFRICAN AMERICAN): >60 ML/MIN/1.73 M^2
EST. GFR  (NON AFRICAN AMERICAN): 59 ML/MIN/1.73 M^2
GLUCOSE SERPL-MCNC: 75 MG/DL
HCT VFR BLD AUTO: 28.5 %
HGB BLD-MCNC: 9.3 G/DL
LYMPHOCYTES # BLD AUTO: 2.1 K/UL
LYMPHOCYTES NFR BLD: 23.8 %
MAGNESIUM SERPL-MCNC: 1.8 MG/DL
MCH RBC QN AUTO: 30.2 PG
MCHC RBC AUTO-ENTMCNC: 32.5 G/DL
MCV RBC AUTO: 93 FL
MONOCYTES # BLD AUTO: 0.6 K/UL
MONOCYTES NFR BLD: 6.5 %
NEUTROPHILS # BLD AUTO: 5.9 K/UL
NEUTROPHILS NFR BLD: 66.9 %
PHOSPHATE SERPL-MCNC: 2.7 MG/DL
PLATELET # BLD AUTO: 157 K/UL
PMV BLD AUTO: 9.1 FL
POCT GLUCOSE: 131 MG/DL (ref 70–110)
POCT GLUCOSE: 152 MG/DL (ref 70–110)
POTASSIUM SERPL-SCNC: 4 MMOL/L
PROCALCITONIN SERPL IA-MCNC: 0.28 NG/ML
RBC # BLD AUTO: 3.07 M/UL
SODIUM SERPL-SCNC: 136 MMOL/L
WBC # BLD AUTO: 8.8 K/UL

## 2019-03-02 PROCEDURE — 99900035 HC TECH TIME PER 15 MIN (STAT)

## 2019-03-02 PROCEDURE — 83735 ASSAY OF MAGNESIUM: CPT

## 2019-03-02 PROCEDURE — 84100 ASSAY OF PHOSPHORUS: CPT

## 2019-03-02 PROCEDURE — 63600175 PHARM REV CODE 636 W HCPCS: Performed by: NURSE PRACTITIONER

## 2019-03-02 PROCEDURE — 25000003 PHARM REV CODE 250: Performed by: INTERNAL MEDICINE

## 2019-03-02 PROCEDURE — 25000003 PHARM REV CODE 250: Performed by: NURSE PRACTITIONER

## 2019-03-02 PROCEDURE — 36415 COLL VENOUS BLD VENIPUNCTURE: CPT

## 2019-03-02 PROCEDURE — 80048 BASIC METABOLIC PNL TOTAL CA: CPT

## 2019-03-02 PROCEDURE — 25000242 PHARM REV CODE 250 ALT 637 W/ HCPCS: Performed by: HOSPITALIST

## 2019-03-02 PROCEDURE — 84145 PROCALCITONIN (PCT): CPT

## 2019-03-02 PROCEDURE — 27000221 HC OXYGEN, UP TO 24 HOURS

## 2019-03-02 PROCEDURE — 63600175 PHARM REV CODE 636 W HCPCS: Performed by: INTERNAL MEDICINE

## 2019-03-02 PROCEDURE — 85025 COMPLETE CBC W/AUTO DIFF WBC: CPT

## 2019-03-02 PROCEDURE — 12000002 HC ACUTE/MED SURGE SEMI-PRIVATE ROOM

## 2019-03-02 PROCEDURE — 94640 AIRWAY INHALATION TREATMENT: CPT

## 2019-03-02 PROCEDURE — 94761 N-INVAS EAR/PLS OXIMETRY MLT: CPT

## 2019-03-02 RX ORDER — ACETAMINOPHEN 10 MG/ML
1000 INJECTION, SOLUTION INTRAVENOUS ONCE
Status: COMPLETED | OUTPATIENT
Start: 2019-03-02 | End: 2019-03-02

## 2019-03-02 RX ORDER — ENALAPRIL MALEATE 5 MG/1
5 TABLET ORAL DAILY
Status: DISCONTINUED | OUTPATIENT
Start: 2019-03-02 | End: 2019-03-02

## 2019-03-02 RX ORDER — FLUTICASONE FUROATE AND VILANTEROL 100; 25 UG/1; UG/1
1 POWDER RESPIRATORY (INHALATION) DAILY
Status: DISCONTINUED | OUTPATIENT
Start: 2019-03-02 | End: 2019-03-02 | Stop reason: SDUPTHER

## 2019-03-02 RX ORDER — EZETIMIBE 10 MG/1
10 TABLET ORAL DAILY
Status: DISCONTINUED | OUTPATIENT
Start: 2019-03-02 | End: 2019-03-02

## 2019-03-02 RX ORDER — LORAZEPAM 1 MG/1
1 TABLET ORAL NIGHTLY PRN
Status: DISCONTINUED | OUTPATIENT
Start: 2019-03-02 | End: 2019-03-06 | Stop reason: HOSPADM

## 2019-03-02 RX ORDER — QUETIAPINE FUMARATE 100 MG/1
200 TABLET, FILM COATED ORAL NIGHTLY
Status: DISCONTINUED | OUTPATIENT
Start: 2019-03-02 | End: 2019-03-06 | Stop reason: HOSPADM

## 2019-03-02 RX ORDER — SODIUM CHLORIDE 9 MG/ML
INJECTION, SOLUTION INTRAVENOUS CONTINUOUS
Status: DISCONTINUED | OUTPATIENT
Start: 2019-03-02 | End: 2019-03-04

## 2019-03-02 RX ORDER — FLUTICASONE FUROATE AND VILANTEROL 100; 25 UG/1; UG/1
1 POWDER RESPIRATORY (INHALATION) DAILY
Status: DISCONTINUED | OUTPATIENT
Start: 2019-03-02 | End: 2019-03-06 | Stop reason: HOSPADM

## 2019-03-02 RX ORDER — SIMVASTATIN 40 MG/1
40 TABLET, FILM COATED ORAL NIGHTLY
Status: DISCONTINUED | OUTPATIENT
Start: 2019-03-02 | End: 2019-03-02

## 2019-03-02 RX ORDER — LEVOTHYROXINE SODIUM 100 UG/1
100 TABLET ORAL
Status: DISCONTINUED | OUTPATIENT
Start: 2019-03-02 | End: 2019-03-06 | Stop reason: HOSPADM

## 2019-03-02 RX ORDER — GABAPENTIN 300 MG/1
300 CAPSULE ORAL NIGHTLY
Status: DISCONTINUED | OUTPATIENT
Start: 2019-03-02 | End: 2019-03-06 | Stop reason: HOSPADM

## 2019-03-02 RX ORDER — CEFEPIME HYDROCHLORIDE 1 G/50ML
1 INJECTION, SOLUTION INTRAVENOUS
Status: DISCONTINUED | OUTPATIENT
Start: 2019-03-02 | End: 2019-03-03

## 2019-03-02 RX ORDER — PANTOPRAZOLE SODIUM 40 MG/1
40 TABLET, DELAYED RELEASE ORAL DAILY
Status: DISCONTINUED | OUTPATIENT
Start: 2019-03-02 | End: 2019-03-06 | Stop reason: HOSPADM

## 2019-03-02 RX ADMIN — SODIUM CHLORIDE 500 ML: 0.9 INJECTION, SOLUTION INTRAVENOUS at 11:03

## 2019-03-02 RX ADMIN — ACETAMINOPHEN 1000 MG: 10 INJECTION, SOLUTION INTRAVENOUS at 11:03

## 2019-03-02 RX ADMIN — CEFEPIME HYDROCHLORIDE 1 G: 1 INJECTION, SOLUTION INTRAVENOUS at 02:03

## 2019-03-02 RX ADMIN — SODIUM CHLORIDE: 0.9 INJECTION, SOLUTION INTRAVENOUS at 09:03

## 2019-03-02 RX ADMIN — CEFEPIME HYDROCHLORIDE 1 G: 1 INJECTION, SOLUTION INTRAVENOUS at 09:03

## 2019-03-02 RX ADMIN — QUETIAPINE FUMARATE 200 MG: 100 TABLET ORAL at 09:03

## 2019-03-02 RX ADMIN — ENOXAPARIN SODIUM 40 MG: 100 INJECTION SUBCUTANEOUS at 06:03

## 2019-03-02 RX ADMIN — SODIUM CHLORIDE: 0.9 INJECTION, SOLUTION INTRAVENOUS at 02:03

## 2019-03-02 RX ADMIN — ACETAMINOPHEN 1000 MG: 10 INJECTION, SOLUTION INTRAVENOUS at 02:03

## 2019-03-02 RX ADMIN — PANTOPRAZOLE SODIUM 40 MG: 40 TABLET, DELAYED RELEASE ORAL at 09:03

## 2019-03-02 RX ADMIN — SODIUM CHLORIDE 500 ML: 0.9 INJECTION, SOLUTION INTRAVENOUS at 12:03

## 2019-03-02 RX ADMIN — QUETIAPINE FUMARATE 200 MG: 100 TABLET ORAL at 02:03

## 2019-03-02 RX ADMIN — GABAPENTIN 300 MG: 300 CAPSULE ORAL at 09:03

## 2019-03-02 RX ADMIN — CEFEPIME HYDROCHLORIDE 1 G: 1 INJECTION, SOLUTION INTRAVENOUS at 06:03

## 2019-03-02 RX ADMIN — LORAZEPAM 1 MG: 1 TABLET ORAL at 10:03

## 2019-03-02 RX ADMIN — EZETIMIBE 10 MG: 10 TABLET ORAL at 09:03

## 2019-03-02 RX ADMIN — FLUTICASONE FUROATE AND VILANTEROL TRIFENATATE 1 PUFF: 100; 25 POWDER RESPIRATORY (INHALATION) at 08:03

## 2019-03-02 RX ADMIN — LEVOTHYROXINE SODIUM 100 MCG: 100 TABLET ORAL at 05:03

## 2019-03-02 RX ADMIN — RAMELTEON 8 MG: 8 TABLET, FILM COATED ORAL at 10:03

## 2019-03-02 RX ADMIN — ENALAPRIL MALEATE 5 MG: 5 TABLET ORAL at 09:03
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
## 2019-03-02 NOTE — ASSESSMENT & PLAN NOTE
Contributing Nutrition Diagnosis  Severe malnutrition in context of acute on chronic illness     Related to (etiology):   Decreased appetite     Signs and Symptoms (as evidenced by):   1) Severe fat/muscle depletion noted around temples, eyes, depressions on dorsal area of hand, protruding clavicle, and in upper arm  2) 21% wt loss x 5 months (4-9 2018)    3) stage 3 PI on sacrum     Interventions/Recommendations (treatment strategy):  1) DM/cardiac diet no calorie restriction, texture per SLP 2) Glucerna BID-pt preference 3) nutrition education on weight gain given, pt forgetful     Nutrition Diagnosis Status:   Continues

## 2019-03-02 NOTE — PLAN OF CARE
Pt lives w/ dtr, Sepideh, is active w/ [facility]--requests to resume w/ same.  Disclosure signed.  Pt recently d/c from [facility] last month.         03/02/19 8752   Discharge Assessment   Assessment Type Discharge Planning Assessment   Confirmed/corrected address and phone number on facesheet? Yes   Assessment information obtained from? Patient;Caregiver   Prior to hospitilization cognitive status: Alert/Oriented   Prior to hospitalization functional status: Independent;Assistive Equipment   Current cognitive status: Alert/Oriented   Current Functional Status: Independent;Assistive Equipment   Facility Arrived From: home   Lives With child(robb), adult   Able to Return to Prior Arrangements yes   Is patient able to care for self after discharge? Yes   Who are your caregiver(s) and their phone number(s)? daughter:  [facility]  [phone]   Patient's perception of discharge disposition home health   Readmission Within the Last 30 Days no previous admission in last 30 days   Patient currently being followed by outpatient case management? No   Patient currently receives any other outside agency services? Yes   Name and contact number of agency or person providing outside services [facility]    Is it the patient/care giver preference to resume care with the current outside agency? Yes   Equipment Currently Used at Home respiratory supplies;rollator;walker, rolling;3-in-1 commode;cane, quad   Do you have any problems affording any of your prescribed medications? No   Is the patient taking medications as prescribed? yes   Does the patient have transportation home? Yes   Transportation Anticipated family or friend will provide   Does the patient receive services at the [facility]? No   Discharge Plan A Home Health;Home with family   Discharge Plan B Home Health;Home with family   DME Needed Upon Discharge  none   Patient/Family in Agreement with Plan yes

## 2019-03-02 NOTE — ASSESSMENT & PLAN NOTE
Chronic/Controlled. Not on chronic medications.  Monitor blood sugars QAC&HS  SSI prn  Diabetic diet
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
## 2019-03-02 NOTE — PROGRESS NOTES
"Intervention: carbohydrate/fat/sodium modified diet and nutrition supplement therapy-commercial beverage    Recommendation:   1) Rec. DM diet 3-4 carb servings/meal no calorie restriction, cardiac diet  2) Add Boost glucose control BID and consider liberlizing diet if PO intakes < 50% of meals @ f/u    3) obtain measured weight on pt    Goals: 1) PO intakes > 50% of meals and supplements at f/u   Nutrition Goal Status: New  Communication of RD Recs: (plan of care, sticky note, second sign)     D/C planning: To be determined- DM diet 3-4 carb servings/meal no calorie restriction, cardiac diet + Boost Glucose control 2-3 x dialy     Reason for Assessment     Reason for Assessment: RD follow up  Diagnosis: RLL pneumonia  Relevant Medical History: DM, Chrohn's, Diverticulitis, GERD, HLD, COPD; pain pump, dementia, severe malnutrition.     General Information Comments: 83 y/o female admitted with pneumonia. With sacral wound stage 3. Reports good appetite PTA and PO intake 75% of 1 meal inpatient.  NFPE revealed significant fat/muscle loss, continues with severe malnutrition. No measured weight this admission. Will perform nutrition education at f/u when able to discuss with daughter.     Nutrition Risk Screen     Nutrition Risk Screen: no indicators present    Nutrition/Diet History     Food Preferences: Reports pt usually eats cereal or oatmeal for breakfast, around 10 am sweet potato with vegetables, around 1-2 peanut butter sandwich and occasionally eats out at [employer] or [employer].  Notes that pt does not like to eat past then as it affects her hiatal hernia and she is uncomfortable.   Do you have any cultural, spiritual, Church conflicts, given your current situation?: Hindu, no blood  Food Allergies: (noted latex (occassionally foods restricted with latex))  Factors Affecting Nutritional Intake: None at this time     Anthropometrics    Height Method: Stated  Height: 5' 6" (167.6 cm)  Height " (inches): 66 in  Weight Method: Stated  Weight: 51.7 3/1/19 ( 47.2 kg12/20/18)  Weight (lb): 114 lb  Ideal Body Weight (IBW), Female: 130 lb  % Ideal Body Weight, Female (lb): 80.38 lb  BMI (Calculated): 18.9 kg/m2 stated wt  BMI Grade: underweight for age  Weight Loss: 21% x 5 months- ? Continues (59.5 kg 4/2018)        Lab/Procedures/Meds     Pertinent Labs Reviewed: reviewed  Lab Results   Component Value Date    ALBUMIN 2.5 (L) 03/01/2019     BMP  Lab Results   Component Value Date     03/02/2019    K 4.0 03/02/2019     03/02/2019    CO2 26 03/02/2019    BUN 15 03/02/2019    CREATININE 0.9 03/02/2019    CALCIUM 7.4 (L) 03/02/2019    ANIONGAP 6 (L) 03/02/2019    ESTGFRAFRICA >60 03/02/2019    EGFRNONAA 59 (A) 03/02/2019     POCT Glucose   Date Value Ref Range Status   03/02/2019 152 (H) 70 - 110 mg/dL Final   03/02/2019 131 (H) 70 - 110 mg/dL Final     Lab Results   Component Value Date    HGBA1C 6.0 (H) 09/22/2018       Pertinent Medications Reviewed: reviewed  Pertinent Medications Comments: NS @ 100 ml/hr, zofran, senna     Estimated/Assessed Needs     Weight Used For Calorie Calculations: 47 kg (last measured weight 12/20/18)  Energy Calorie Requirements (kcal): 1410 (RMR x 1.5 wt gain)  [facility]: [facility] [place] [facility]  Protein Requirements: 57-66 g (1.2-1.4g/kg)  Weight Used For Protein Calculations: 47.4 kg (104 lb 8 oz)     Fluid Need Method: RDA Method  RDA Method (mL): 1410  CHO Requirement: 45-50% EEN        Nutrition Prescription Ordered     Current Diet Order: DM 2000, cardiac diet     Evaluation of Received Nutrient/Fluid Intake     IV Fluid (mL): NS @ 100 ml/hr     Energy Calories Required: Meeting needs  Protein Required: not meeting needs  Fluid Required: exceeding needs  % Energy Intake: 100%  % Meal Intake: 75%     Nutrition Risk     Level of Risk/Frequency of Follow-up: moderate (2 x week until appetite improves)      Assessment and Plan          Severe malnutrition      Contributing Nutrition Diagnosis  Severe malnutrition in context of acute on chronic illness     Related to (etiology):   Decreased appetite     Signs and Symptoms (as evidenced by):   1) Severe fat/muscle depletion noted around temples, eyes, depressions on dorsal area of hand, protruding clavicle, and in upper arm  2) 21% wt loss x 5 months (4-9 2018)    3) stage 3 PI on sacrum    Interventions/Recommendations (treatment strategy):  1) DM/cardiac diet no calorie restriction 2) Boost glucose control BID 3) nutrition education on weight gain at f/u      Nutrition Diagnosis Status:   Continues             Non-insulin dependent type 2 diabetes mellitus     Contributing Nutrition Diagnosis  Altered lab values     Related to (etiology):   Altered absorption of nutrients, inconsistent carbohydrate intake     Signs and Symptoms (as evidenced by):   Elevated glucose and A1C     Interventions/Recommendations (treatment strategy):  1) DM diet restriction 3-4 carb servings/meal     Nutrition Diagnosis Status:   New          Malnutrition Assessment  Severe fat/muscle depletion noted around temples, eyes, depressions on dorsal area of hand, protruding clavicle, and in upper arm  Дмитрий: 14, stage 3 sacral PI  21% wt loss x 5 months    Monitor and Evaluation     Food and Nutrient Intake: energy intake, food and beverage intake  Food and Nutrient Adminstration: diet order  Physical Activity and Function: nutrition-related ADLs and IADLs  Anthropometric Measurements: weight  Biochemical Data, Medical Tests and Procedures: electrolyte and renal panel, glucose/endocrine profile, albumin  Nutrition-Focused Physical Findings: overall appearance, skin     Nutrition Follow-Up     RD Follow-up?: Yes
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
## 2019-03-02 NOTE — ASSESSMENT & PLAN NOTE
Chronic. Continue home levothyroxine dose.  Lab Results   Component Value Date    TSH 2.696 02/08/2016

## 2019-03-02 NOTE — PLAN OF CARE
Pt to room at 2300 via ED stretcher. Alert and oriented to self. Complaint of pain (chronic). Medicated. Daughter at bedside. Rawls noted draining to gravity. Bed low and locked. Bed alarm set. Call light in reach.

## 2019-03-02 NOTE — PLAN OF CARE
Problem: Malnutrition  Goal: Improved Nutritional Intake    Intervention: Promote and Optimize Oral Intake  Intervention: carbohydrate/fat/sodium modified diet and nutrition supplement therapy-commercial beverage    Recommendation:   1) Rec. DM diet 3-4 carb servings/meal no calorie restriction, cardiac diet  2) Add Boost glucose control BID and consider liberlizing diet if PO intakes < 50% of meals @ f/u    3) obtain measured weight on pt    Goals: 1) PO intakes > 50% of meals and supplements at f/u   Nutrition Goal Status: New  Communication of RD Recs: (plan of care, sticky note, second sign)

## 2019-03-02 NOTE — PROGRESS NOTES
03/02/19 0849   Patient Assessment/Suction   Level of Consciousness (AVPU) alert   Respiratory Effort Normal;Unlabored   All Lung Fields Breath Sounds diminished   PRE-TX-O2   O2 Device (Oxygen Therapy) nasal cannula   $ Is the patient on Low Flow Oxygen? Yes   Flow (L/min) 3  (2L home regimen, SpO2 88 on 2 increased to 3L.)   SpO2 (!) 92 %   Pulse Oximetry Type Intermittent   $ Pulse Oximetry - Multiple Charge Pulse Oximetry - Multiple   Pulse 87   Resp 18   Aerosol Therapy   $ Aerosol Therapy Charges PRN treatment not required   Respiratory Treatment Status (SVN) PRN treatment not required   Inhaler   $ Inhaler Charges MDI (Metered Dose Inahler) Treatment   Respiratory Treatment Status (Inhaler) given   Treatment Route (Inhaler) mouthpiece   Patient Position (Inhaler) Brock's   Post Treatment Assessment (Inhaler) breath sounds unchanged   Signs of Intolerance (Inhaler) none   Breath Sounds Post-Respiratory Treatment   Post-treatment Heart Rate (beats/min) 87   Post-treatment Resp Rate (breaths/min) 18   Breo QD, Duo Q6PRN, tolerated MDI well, no aerosol tx needed this am.

## 2019-03-03 LAB
ANION GAP SERPL CALC-SCNC: 4 MMOL/L
BASOPHILS # BLD AUTO: 0 K/UL
BASOPHILS NFR BLD: 0.4 %
BUN SERPL-MCNC: 14 MG/DL
CALCIUM SERPL-MCNC: 7.1 MG/DL
CHLORIDE SERPL-SCNC: 111 MMOL/L
CO2 SERPL-SCNC: 27 MMOL/L
CREAT SERPL-MCNC: 0.9 MG/DL
DIFFERENTIAL METHOD: ABNORMAL
EOSINOPHIL # BLD AUTO: 0.5 K/UL
EOSINOPHIL NFR BLD: 8.2 %
ERYTHROCYTE [DISTWIDTH] IN BLOOD BY AUTOMATED COUNT: 15.7 %
EST. GFR  (AFRICAN AMERICAN): >60 ML/MIN/1.73 M^2
EST. GFR  (NON AFRICAN AMERICAN): 59 ML/MIN/1.73 M^2
GLUCOSE SERPL-MCNC: 86 MG/DL
HCT VFR BLD AUTO: 27.1 %
HGB BLD-MCNC: 8.7 G/DL
LYMPHOCYTES # BLD AUTO: 1.6 K/UL
LYMPHOCYTES NFR BLD: 28.3 %
MAGNESIUM SERPL-MCNC: 1.8 MG/DL
MCH RBC QN AUTO: 29.7 PG
MCHC RBC AUTO-ENTMCNC: 32.2 G/DL
MCV RBC AUTO: 92 FL
MONOCYTES # BLD AUTO: 0.4 K/UL
MONOCYTES NFR BLD: 7.2 %
NEUTROPHILS # BLD AUTO: 3.2 K/UL
NEUTROPHILS NFR BLD: 55.9 %
PLATELET # BLD AUTO: 177 K/UL
PMV BLD AUTO: 8.8 FL
POCT GLUCOSE: 159 MG/DL (ref 70–110)
POCT GLUCOSE: 98 MG/DL (ref 70–110)
POTASSIUM SERPL-SCNC: 4.1 MMOL/L
RBC # BLD AUTO: 2.94 M/UL
SODIUM SERPL-SCNC: 142 MMOL/L
WBC # BLD AUTO: 5.7 K/UL

## 2019-03-03 PROCEDURE — 27000221 HC OXYGEN, UP TO 24 HOURS

## 2019-03-03 PROCEDURE — 80048 BASIC METABOLIC PNL TOTAL CA: CPT

## 2019-03-03 PROCEDURE — 63600175 PHARM REV CODE 636 W HCPCS: Performed by: NURSE PRACTITIONER

## 2019-03-03 PROCEDURE — 94640 AIRWAY INHALATION TREATMENT: CPT

## 2019-03-03 PROCEDURE — 63600175 PHARM REV CODE 636 W HCPCS: Performed by: INTERNAL MEDICINE

## 2019-03-03 PROCEDURE — 83735 ASSAY OF MAGNESIUM: CPT

## 2019-03-03 PROCEDURE — 36415 COLL VENOUS BLD VENIPUNCTURE: CPT

## 2019-03-03 PROCEDURE — 12000002 HC ACUTE/MED SURGE SEMI-PRIVATE ROOM

## 2019-03-03 PROCEDURE — 99900035 HC TECH TIME PER 15 MIN (STAT)

## 2019-03-03 PROCEDURE — 94761 N-INVAS EAR/PLS OXIMETRY MLT: CPT

## 2019-03-03 PROCEDURE — 85025 COMPLETE CBC W/AUTO DIFF WBC: CPT

## 2019-03-03 PROCEDURE — 25000003 PHARM REV CODE 250: Performed by: NURSE PRACTITIONER

## 2019-03-03 RX ORDER — CEFEPIME HYDROCHLORIDE 1 G/50ML
1 INJECTION, SOLUTION INTRAVENOUS
Status: DISCONTINUED | OUTPATIENT
Start: 2019-03-03 | End: 2019-03-04

## 2019-03-03 RX ADMIN — RAMELTEON 8 MG: 8 TABLET, FILM COATED ORAL at 08:03

## 2019-03-03 RX ADMIN — FLUTICASONE FUROATE AND VILANTEROL TRIFENATATE 1 PUFF: 100; 25 POWDER RESPIRATORY (INHALATION) at 08:03

## 2019-03-03 RX ADMIN — LEVOTHYROXINE SODIUM 100 MCG: 100 TABLET ORAL at 06:03

## 2019-03-03 RX ADMIN — PANTOPRAZOLE SODIUM 40 MG: 40 TABLET, DELAYED RELEASE ORAL at 08:03

## 2019-03-03 RX ADMIN — GABAPENTIN 300 MG: 300 CAPSULE ORAL at 08:03

## 2019-03-03 RX ADMIN — LORAZEPAM 1 MG: 1 TABLET ORAL at 08:03

## 2019-03-03 RX ADMIN — ONDANSETRON 4 MG: 2 INJECTION INTRAMUSCULAR; INTRAVENOUS at 08:03

## 2019-03-03 RX ADMIN — QUETIAPINE FUMARATE 200 MG: 100 TABLET ORAL at 08:03

## 2019-03-03 RX ADMIN — CEFEPIME HYDROCHLORIDE 1 G: 1 INJECTION, SOLUTION INTRAVENOUS at 02:03

## 2019-03-03 RX ADMIN — ENOXAPARIN SODIUM 40 MG: 100 INJECTION SUBCUTANEOUS at 05:03

## 2019-03-03 RX ADMIN — CEFEPIME HYDROCHLORIDE 1 G: 1 INJECTION, SOLUTION INTRAVENOUS at 01:03

## 2019-03-03 NOTE — PROGRESS NOTES
[facility] Hospital Medicine  Progress Note    Patient Name: Shazia Stacy  MRN: [id number]  Patient Class: IP- Inpatient   Admission Date: 3/1/2019  Length of Stay: 2 days  Attending Physician: Keisha Cook MD  Primary Care Provider: Nikhil Albrecht MD        Subjective:     Principal Problem:RLL pneumonia    HPI:  Ms. Stacy is an 85yo F with a PMH of DM, thyroid disease, diverticulitis, Crohn's disease, HLD, and COPD. She was discharged from [facility] 1.5 weeks ago for pneumonia, after being there for 2 weeks. She presents to the ED with c/o fever. Her daughter reports that it was 103.0 at home. Her appetite has been good. She had her sacral wound debrided 2/26. While in the ED, her CXR showed a RLL pneumonia and IV Azactam, Azithromycin, and Vancomycin were given. Her daughter is at bedside and is the primary historian. She requests IV tylenol for pain. Code status discussed and she requests a DNR status.         Hospital Course:  No notes on file    Interval History:  The patient feels a little bit better.  She is still weak but not as short of breath and the cough is improved.  She denies having pain    Review of Systems   Respiratory: Negative for cough, chest tightness, shortness of breath and wheezing.    Cardiovascular: Negative for chest pain, palpitations and leg swelling.   Gastrointestinal: Negative for abdominal distention, abdominal pain, constipation, diarrhea, nausea and vomiting.   Genitourinary: Negative for difficulty urinating, dysuria and flank pain.   Musculoskeletal: Negative for gait problem, joint swelling and neck pain.   Skin: Negative for rash and wound.   Neurological: Positive for weakness. Negative for dizziness, syncope, light-headedness and headaches.   Psychiatric/Behavioral: Negative for agitation, behavioral problems and confusion.     Objective:     Vital Signs (Most Recent):  Temp: 96.3 °F (35.7 °C) (03/03/19 1116)  Pulse: 62 (03/03/19 1116)  Resp: 12 (03/03/19  1116)  BP: (!) 110/54 (03/03/19 1116)  SpO2: 97 % (03/03/19 1116) Vital Signs (24h Range):  Temp:  [96.2 °F (35.7 °C)-98.3 °F (36.8 °C)] 96.3 °F (35.7 °C)  Pulse:  [51-63] 62  Resp:  [12-18] 12  SpO2:  [91 %-98 %] 97 %  BP: ()/(50-77) 110/54     Weight: 51.7 kg (114 lb)  Body mass index is 18.97 kg/m².    Intake/Output Summary (Last 24 hours) at 3/3/2019 1131  Last data filed at 3/2/2019 1820  Gross per 24 hour   Intake 290 ml   Output 1150 ml   Net -860 ml      Physical Exam   Constitutional: She is oriented to person, place, and time. No distress.   Neck: Normal range of motion. Neck supple. No JVD present. No tracheal deviation present.   Cardiovascular: Normal rate, regular rhythm, normal heart sounds and intact distal pulses.   Pulmonary/Chest: Effort normal and breath sounds normal.   The patient has clubbing in her hands   Abdominal: Soft. Bowel sounds are normal. She exhibits no distension. There is no tenderness.   Musculoskeletal: Normal range of motion.   Neurological: She is alert and oriented to person, place, and time.   Skin: Skin is warm and dry. Capillary refill takes less than 2 seconds.   Sacral dressing CDI.   Psychiatric: She has a normal mood and affect. Her behavior is normal. Judgment and thought content normal.       Significant Labs:   BMP:   Recent Labs   Lab 03/03/19  0541   GLU 86      K 4.1   *   CO2 27   BUN 14   CREATININE 0.9   CALCIUM 7.1*   MG 1.8     CBC:   Recent Labs   Lab 03/01/19 2007 03/02/19  0510 03/03/19  0541   WBC 9.40 8.80 5.70   HGB 9.2* 9.3* 8.7*   HCT 28.8* 28.5* 27.1*    157 177       Significant Imaging: I have reviewed all pertinent imaging results/findings within the past 24 hours.    Assessment/Plan:      * RLL pneumonia    The patient is improving.  Continue IV antibiotics.  I will order a chest CT because she has recurring pneumonia.       Type 2 diabetes mellitus with other specified complication    Patient's FSGs are controlled on  current hypoglycemics.   Last A1c reviewed-   Lab Results   Component Value Date    HGBA1C 6.0 (H) 09/22/2018     Most recent fingerstick glucose reviewed-   Recent Labs   Lab 03/02/19  1612   POCTGLUCOSE 152*     Current correctional scale  Low  Maintain anti-hyperglycemic dose as follows-   Antihyperglycemics (From admission, onward)    None               Chronic respiratory failure on home O2/ COPD    Continue supplemental O2 and nebulizers       Pressure injury of sacral region, stage 3    Debrided 2/26 per Dr. Castellano  Wound care consult  Turn Q2H     Chronic pain syndrome    Has implanted dilaudid pain pump       Dementia    Stable. Continue Seroquel.  Delirium precautions.       Debility    PT/OT consults  Fall precautions       Hyperlipidemia associated with type 2 diabetes mellitus    Continue Zetia and statin  Not on chronic ASA       Hypothyroidism    Chronic. Continue home levothyroxine dose.  Lab Results   Component Value Date    TSH 2.696 02/08/2016            Crohn's disease without complication    Chronic/stable  On Cimzia         VTE Risk Mitigation (From admission, onward)        Ordered     enoxaparin injection 40 mg  Daily      03/01/19 2253     IP VTE HIGH RISK PATIENT  Once      03/01/19 2253              Keisha Cook MD  Department of Hospital Medicine   [facility]

## 2019-03-03 NOTE — ASSESSMENT & PLAN NOTE
Patient's FSGs are controlled on current hypoglycemics.   Last A1c reviewed-   Lab Results   Component Value Date    HGBA1C 6.0 (H) 09/22/2018     Most recent fingerstick glucose reviewed-   Recent Labs   Lab 03/02/19  1612   POCTGLUCOSE 152*     Current correctional scale  Low  Maintain anti-hyperglycemic dose as follows-   Antihyperglycemics (From admission, onward)    None

## 2019-03-03 NOTE — PROGRESS NOTES
03/03/19 0810   Patient Assessment/Suction   Level of Consciousness (AVPU) alert   Respiratory Effort Normal;Unlabored   All Lung Fields Breath Sounds diminished   PRE-TX-O2   O2 Device (Oxygen Therapy) nasal cannula   $ Is the patient on Low Flow Oxygen? Yes   Flow (L/min) 4   SpO2 98 %   Pulse Oximetry Type Intermittent   $ Pulse Oximetry - Multiple Charge Pulse Oximetry - Multiple   Pulse 63   Resp 18   Aerosol Therapy   $ Aerosol Therapy Charges PRN treatment not required   Respiratory Treatment Status (SVN) PRN treatment not required   Inhaler   $ Inhaler Charges MDI (Metered Dose Inahler) Treatment   Respiratory Treatment Status (Inhaler) given   Treatment Route (Inhaler) mouthpiece   Patient Position (Inhaler) Brock's   Post Treatment Assessment (Inhaler) breath sounds unchanged   Signs of Intolerance (Inhaler) none   Breath Sounds Post-Respiratory Treatment   Post-treatment Heart Rate (beats/min) 63   Post-treatment Resp Rate (breaths/min) 18   Breo QD, Duo Q6PRN, no tx needed, tolerated MDI well, vitals as charted.

## 2019-03-03 NOTE — SUBJECTIVE & OBJECTIVE
Interval History:  The patient feels a little bit better.  She is still weak but not as short of breath and the cough is improved.  She denies having pain    Review of Systems   Respiratory: Negative for cough, chest tightness, shortness of breath and wheezing.    Cardiovascular: Negative for chest pain, palpitations and leg swelling.   Gastrointestinal: Negative for abdominal distention, abdominal pain, constipation, diarrhea, nausea and vomiting.   Genitourinary: Negative for difficulty urinating, dysuria and flank pain.   Musculoskeletal: Negative for gait problem, joint swelling and neck pain.   Skin: Negative for rash and wound.   Neurological: Positive for weakness. Negative for dizziness, syncope, light-headedness and headaches.   Psychiatric/Behavioral: Negative for agitation, behavioral problems and confusion.     Objective:     Vital Signs (Most Recent):  Temp: 96.3 °F (35.7 °C) (03/03/19 1116)  Pulse: 62 (03/03/19 1116)  Resp: 12 (03/03/19 1116)  BP: (!) 110/54 (03/03/19 1116)  SpO2: 97 % (03/03/19 1116) Vital Signs (24h Range):  Temp:  [96.2 °F (35.7 °C)-98.3 °F (36.8 °C)] 96.3 °F (35.7 °C)  Pulse:  [51-63] 62  Resp:  [12-18] 12  SpO2:  [91 %-98 %] 97 %  BP: ()/(50-77) 110/54     Weight: 51.7 kg (114 lb)  Body mass index is 18.97 kg/m².    Intake/Output Summary (Last 24 hours) at 3/3/2019 1131  Last data filed at 3/2/2019 1820  Gross per 24 hour   Intake 290 ml   Output 1150 ml   Net -860 ml      Physical Exam   Constitutional: She is oriented to person, place, and time. No distress.   Neck: Normal range of motion. Neck supple. No JVD present. No tracheal deviation present.   Cardiovascular: Normal rate, regular rhythm, normal heart sounds and intact distal pulses.   Pulmonary/Chest: Effort normal and breath sounds normal.   The patient has clubbing in her hands   Abdominal: Soft. Bowel sounds are normal. She exhibits no distension. There is no tenderness.   Musculoskeletal: Normal range of motion.    Neurological: She is alert and oriented to person, place, and time.   Skin: Skin is warm and dry. Capillary refill takes less than 2 seconds.   Sacral dressing CDI.   Psychiatric: She has a normal mood and affect. Her behavior is normal. Judgment and thought content normal.       Significant Labs:   BMP:   Recent Labs   Lab 03/03/19  0541   GLU 86      K 4.1   *   CO2 27   BUN 14   CREATININE 0.9   CALCIUM 7.1*   MG 1.8     CBC:   Recent Labs   Lab 03/01/19 2007 03/02/19  0510 03/03/19  0541   WBC 9.40 8.80 5.70   HGB 9.2* 9.3* 8.7*   HCT 28.8* 28.5* 27.1*    157 177       Significant Imaging: I have reviewed all pertinent imaging results/findings within the past 24 hours.

## 2019-03-03 NOTE — PLAN OF CARE
"Pt going downstairs for CT chest. Refused contrast. Pt stated she was allergic to fish and "never gets contrast and doesn't want it." Dr. Cook notified via secure chat.  "

## 2019-03-03 NOTE — PROGRESS NOTES
Pharmacist Renal Dose Adjustment Note    Shazia Stacy is a 84 y.o. female being treated with the medication cefepime.    Patient Data:    Vital Signs (Most Recent):  Temp: 97 °F (36.1 °C) (03/03/19 0752)  Pulse: 63 (03/03/19 0810)  Resp: 18 (03/03/19 0810)  BP: 133/77 (03/03/19 0752)  SpO2: 98 % (03/03/19 0810) Vital Signs (72h Range):  Temp:  [96.2 °F (35.7 °C)-100.6 °F (38.1 °C)]   Pulse:  [51-99]   Resp:  [14-18]   BP: ()/(46-77)   SpO2:  [90 %-98 %]      Recent Labs   Lab 03/01/19 2007 03/02/19 0510 03/03/19 0541   CREATININE 1.0 0.9 0.9     Serum creatinine: 0.9 mg/dL 03/03/19 0541  Estimated creatinine clearance: 38 mL/min    Medication: Cefepime 1 gm every 8 hours will be changed to every 12 hours.    Prince Silver, AmeliaD

## 2019-03-03 NOTE — ASSESSMENT & PLAN NOTE
The patient is improving.  Continue IV antibiotics.  I will order a chest CT because she has recurring pneumonia.

## 2019-03-04 LAB
ANION GAP SERPL CALC-SCNC: 6 MMOL/L
BASOPHILS # BLD AUTO: 0 K/UL
BASOPHILS NFR BLD: 0.3 %
BUN SERPL-MCNC: 13 MG/DL
CALCIUM SERPL-MCNC: 7.5 MG/DL
CHLORIDE SERPL-SCNC: 108 MMOL/L
CO2 SERPL-SCNC: 27 MMOL/L
CREAT SERPL-MCNC: 1 MG/DL
DIFFERENTIAL METHOD: ABNORMAL
EOSINOPHIL # BLD AUTO: 0.4 K/UL
EOSINOPHIL NFR BLD: 9 %
ERYTHROCYTE [DISTWIDTH] IN BLOOD BY AUTOMATED COUNT: 15.9 %
EST. GFR  (AFRICAN AMERICAN): 60 ML/MIN/1.73 M^2
EST. GFR  (NON AFRICAN AMERICAN): 52 ML/MIN/1.73 M^2
GLUCOSE SERPL-MCNC: 73 MG/DL
HCT VFR BLD AUTO: 28.6 %
HGB BLD-MCNC: 9.2 G/DL
LYMPHOCYTES # BLD AUTO: 1.7 K/UL
LYMPHOCYTES NFR BLD: 33.9 %
MAGNESIUM SERPL-MCNC: 1.7 MG/DL
MCH RBC QN AUTO: 29.8 PG
MCHC RBC AUTO-ENTMCNC: 32.3 G/DL
MCV RBC AUTO: 93 FL
MONOCYTES # BLD AUTO: 0.4 K/UL
MONOCYTES NFR BLD: 8.8 %
NEUTROPHILS # BLD AUTO: 2.4 K/UL
NEUTROPHILS NFR BLD: 48 %
PLATELET # BLD AUTO: 205 K/UL
PMV BLD AUTO: 8.6 FL
POCT GLUCOSE: 220 MG/DL (ref 70–110)
POCT GLUCOSE: 92 MG/DL (ref 70–110)
POCT GLUCOSE: 96 MG/DL (ref 70–110)
POTASSIUM SERPL-SCNC: 4.4 MMOL/L
RBC # BLD AUTO: 3.09 M/UL
SODIUM SERPL-SCNC: 141 MMOL/L
WBC # BLD AUTO: 5 K/UL

## 2019-03-04 PROCEDURE — 12000002 HC ACUTE/MED SURGE SEMI-PRIVATE ROOM

## 2019-03-04 PROCEDURE — 63600175 PHARM REV CODE 636 W HCPCS: Performed by: NURSE PRACTITIONER

## 2019-03-04 PROCEDURE — 36415 COLL VENOUS BLD VENIPUNCTURE: CPT

## 2019-03-04 PROCEDURE — 63600175 PHARM REV CODE 636 W HCPCS: Performed by: INTERNAL MEDICINE

## 2019-03-04 PROCEDURE — 99900035 HC TECH TIME PER 15 MIN (STAT)

## 2019-03-04 PROCEDURE — 80048 BASIC METABOLIC PNL TOTAL CA: CPT

## 2019-03-04 PROCEDURE — 94761 N-INVAS EAR/PLS OXIMETRY MLT: CPT

## 2019-03-04 PROCEDURE — 27000221 HC OXYGEN, UP TO 24 HOURS

## 2019-03-04 PROCEDURE — 92611 MOTION FLUOROSCOPY/SWALLOW: CPT

## 2019-03-04 PROCEDURE — 92610 EVALUATE SWALLOWING FUNCTION: CPT

## 2019-03-04 PROCEDURE — 25000003 PHARM REV CODE 250: Performed by: NURSE PRACTITIONER

## 2019-03-04 PROCEDURE — 85025 COMPLETE CBC W/AUTO DIFF WBC: CPT

## 2019-03-04 PROCEDURE — 83735 ASSAY OF MAGNESIUM: CPT

## 2019-03-04 PROCEDURE — 94640 AIRWAY INHALATION TREATMENT: CPT

## 2019-03-04 RX ORDER — GABAPENTIN 300 MG/1
300 CAPSULE ORAL ONCE
Status: COMPLETED | OUTPATIENT
Start: 2019-03-04 | End: 2019-03-04

## 2019-03-04 RX ORDER — METRONIDAZOLE 500 MG/1
500 TABLET ORAL EVERY 8 HOURS
Status: DISCONTINUED | OUTPATIENT
Start: 2019-03-05 | End: 2019-03-06 | Stop reason: HOSPADM

## 2019-03-04 RX ADMIN — PANTOPRAZOLE SODIUM 40 MG: 40 TABLET, DELAYED RELEASE ORAL at 09:03

## 2019-03-04 RX ADMIN — LEVOTHYROXINE SODIUM 100 MCG: 100 TABLET ORAL at 05:03

## 2019-03-04 RX ADMIN — GABAPENTIN 300 MG: 300 CAPSULE ORAL at 11:03

## 2019-03-04 RX ADMIN — RAMELTEON 8 MG: 8 TABLET, FILM COATED ORAL at 10:03

## 2019-03-04 RX ADMIN — CEFEPIME HYDROCHLORIDE 1 G: 1 INJECTION, SOLUTION INTRAVENOUS at 02:03

## 2019-03-04 RX ADMIN — QUETIAPINE FUMARATE 200 MG: 100 TABLET ORAL at 08:03

## 2019-03-04 RX ADMIN — LORAZEPAM 1 MG: 1 TABLET ORAL at 11:03

## 2019-03-04 RX ADMIN — FLUTICASONE FUROATE AND VILANTEROL TRIFENATATE 1 PUFF: 100; 25 POWDER RESPIRATORY (INHALATION) at 08:03

## 2019-03-04 RX ADMIN — GABAPENTIN 300 MG: 300 CAPSULE ORAL at 08:03

## 2019-03-04 RX ADMIN — ENOXAPARIN SODIUM 40 MG: 100 INJECTION SUBCUTANEOUS at 06:03

## 2019-03-04 NOTE — PROGRESS NOTES
[facility] Hospital Medicine  Progress Note    Patient Name: Shazia Stacy  MRN: [id number]  Patient Class: IP- Inpatient   Admission Date: 3/1/2019  Length of Stay: 3 days  Attending Physician: Keisha Cook MD  Primary Care Provider: Nikhil Albercht MD        Subjective:     Principal Problem:RLL pneumonia    HPI:  Ms. Stacy is an 83yo F with a PMH of DM, thyroid disease, diverticulitis, Crohn's disease, HLD, and COPD. She was discharged from [facility] 1.5 weeks ago for pneumonia, after being there for 2 weeks. She presents to the ED with c/o fever. Her daughter reports that it was 103.0 at home. Her appetite has been good. She had her sacral wound debrided 2/26. While in the ED, her CXR showed a RLL pneumonia and IV Azactam, Azithromycin, and Vancomycin were given. Her daughter is at bedside and is the primary historian. She requests IV tylenol for pain. Code status discussed and she requests a DNR status.         Hospital Course:  No notes on file    Interval History:  The patient is doing okay.  She feels weak.  She is frustrated because she has been so sick recently.    Review of Systems   Respiratory: Negative for cough, chest tightness, shortness of breath and wheezing.    Cardiovascular: Negative for chest pain, palpitations and leg swelling.   Gastrointestinal: Negative for abdominal distention, abdominal pain, constipation, diarrhea, nausea and vomiting.   Genitourinary: Negative for difficulty urinating, dysuria and flank pain.   Musculoskeletal: Negative for gait problem, joint swelling and neck pain.   Skin: Negative for rash and wound.   Neurological: Positive for weakness. Negative for dizziness, syncope, light-headedness and headaches.   Psychiatric/Behavioral: Negative for agitation, behavioral problems and confusion.     Objective:     Vital Signs (Most Recent):  Temp: 98.6 °F (37 °C) (03/04/19 1123)  Pulse: (!) 55 (03/04/19 1123)  Resp: 18 (03/04/19 1123)  BP: (!) 149/67 (03/04/19  1123)  SpO2: (!) 94 % (03/04/19 1123) Vital Signs (24h Range):  Temp:  [96 °F (35.6 °C)-98.6 °F (37 °C)] 98.6 °F (37 °C)  Pulse:  [52-65] 55  Resp:  [12-18] 18  SpO2:  [94 %-100 %] 94 %  BP: (119-149)/(58-72) 149/67     Weight: 51.7 kg (114 lb)  Body mass index is 18.97 kg/m².    Intake/Output Summary (Last 24 hours) at 3/4/2019 1242  Last data filed at 3/4/2019 0500  Gross per 24 hour   Intake --   Output 2200 ml   Net -2200 ml      Physical Exam   Constitutional: She is oriented to person, place, and time. No distress.   Neck: Normal range of motion. Neck supple. No JVD present. No tracheal deviation present.   Cardiovascular: Normal rate, regular rhythm, normal heart sounds and intact distal pulses.   Pulmonary/Chest: Effort normal and breath sounds normal.   The patient has clubbing in her hands   Abdominal: Soft. Bowel sounds are normal. She exhibits no distension. There is no tenderness.   Musculoskeletal: Normal range of motion.   Neurological: She is alert and oriented to person, place, and time.   Skin: Skin is warm and dry. Capillary refill takes less than 2 seconds.   Sacral dressing CDI.   Psychiatric: She has a normal mood and affect. Her behavior is normal. Judgment and thought content normal.       Significant Labs:   BMP:   Recent Labs   Lab 03/04/19  0423   GLU 73      K 4.4      CO2 27   BUN 13   CREATININE 1.0   CALCIUM 7.5*   MG 1.7     CBC:   Recent Labs   Lab 03/03/19  0541 03/04/19  0423   WBC 5.70 5.00   HGB 8.7* 9.2*   HCT 27.1* 28.6*    205       Significant Imaging: I have reviewed all pertinent imaging results/findings within the past 24 hours.    Assessment/Plan:      * RLL pneumonia    The patient is improving.  Continue IV antibiotics.      Chest CT showed left lung infiltrates and either atelectasis or infiltrate at the right lung base.    The patient will undergo an evaluation for dysphagia.       Type 2 diabetes mellitus with other specified complication     Patient's FSGs are controlled on current hypoglycemics.   Last A1c reviewed-   Lab Results   Component Value Date    HGBA1C 6.0 (H) 09/22/2018     Most recent fingerstick glucose reviewed-   Recent Labs   Lab 03/03/19  1722 03/04/19  1056   POCTGLUCOSE 98 220*     Current correctional scale  Low  Maintain anti-hyperglycemic dose as follows-   Antihyperglycemics (From admission, onward)    None               Chronic respiratory failure on home O2/ COPD    Continue supplemental O2 and nebulizers       Hiatal hernia    The CT scan showed possible gastric volvulus.  I will speak to General surgery about this.  The patient is asymptomatic.       Pressure injury of sacral region, stage 3    Debrided 2/26 per Dr. Castellano  Wound care consult  Turn Q2H     Chronic pain syndrome    Has implanted dilaudid pain pump       Dementia    Stable. Continue Seroquel.  Delirium precautions.       Debility    PT/OT consults  Fall precautions       Hyperlipidemia associated with type 2 diabetes mellitus    Continue Zetia and statin  Not on chronic ASA       Hypothyroidism    Chronic. Continue home levothyroxine dose.  Lab Results   Component Value Date    TSH 2.696 02/08/2016            Crohn's disease without complication    Chronic/stable  On Cimzia         VTE Risk Mitigation (From admission, onward)        Ordered     enoxaparin injection 40 mg  Daily      03/01/19 2253     IP VTE HIGH RISK PATIENT  Once      03/01/19 8233              Keisha Cook MD  Department of Hospital Medicine   [facility]

## 2019-03-04 NOTE — ASSESSMENT & PLAN NOTE
The CT scan showed possible gastric volvulus.  I will speak to General surgery about this.  The patient is asymptomatic.

## 2019-03-04 NOTE — ASSESSMENT & PLAN NOTE
The patient is improving.  Continue IV antibiotics.      Chest CT showed left lung infiltrates and either atelectasis or infiltrate at the right lung base.    The patient will undergo an evaluation for dysphagia.

## 2019-03-04 NOTE — PLAN OF CARE
Problem: SLP Goal  Goal: SLP Goal    1) Participate in MBSS for further evaluation of swallow function.     Clinical swallow evaluation completed. Pt with h/o dysphagia and recurrent PNA. REC MBSS for further evaluation prior to initiation of PO diet.     Yina Howard MS, CCC/SLP 3/4/2019

## 2019-03-04 NOTE — PROGRESS NOTES
03/04/19 0822   Patient Assessment/Suction   Level of Consciousness (AVPU) alert   Respiratory Effort Normal;Unlabored   All Lung Fields Breath Sounds diminished   PRE-TX-O2   O2 Device (Oxygen Therapy) nasal cannula   $ Is the patient on Low Flow Oxygen? Yes   Flow (L/min) 3  (titrated to 2)   Oxygen Concentration (%) 32   SpO2 100 %   Pulse Oximetry Type Intermittent   $ Pulse Oximetry - Multiple Charge Pulse Oximetry - Multiple   Pulse 65   Resp 18   Aerosol Therapy   $ Aerosol Therapy Charges PRN treatment not required   Respiratory Treatment Status (SVN) PRN treatment not required   Inhaler   $ Inhaler Charges MDI (Metered Dose Inahler) Treatment   Respiratory Treatment Status (Inhaler) given   Treatment Route (Inhaler) mouthpiece   Patient Position (Inhaler) Brock's   Post Treatment Assessment (Inhaler) breath sounds unchanged   Signs of Intolerance (Inhaler) none   Breath Sounds Post-Respiratory Treatment   Post-treatment Heart Rate (beats/min) 66   Post-treatment Resp Rate (breaths/min) 18   Duoneb Q6PRN, Breo QD, tolerated inhaler well, vitals as charted.

## 2019-03-04 NOTE — PLAN OF CARE
Problem: SLP Goal  Goal: SLP Goal    1) Participate in MBSS for further evaluation of swallow function--MET  2) Demonstrate use of SSGS with water trials given MIN cues  3) Demonstrate use/understanding of diet recs and swallowing guidelines with SPV      MBSS completed. REC mechanical soft/finely chopped meat textures and NECTAR thick liquids. No mixed consistencies, no fresh/canned fruit x bananas. Remain upright at least one hour after meals. Will follow.     Yina Howard MS, CCC/SLP 3/4/2019

## 2019-03-04 NOTE — PLAN OF CARE
Pt resting quietly at this time. Able to make needs known. Cont of bowel. Curly noted draining to gravity. Denies pain during this shift. Bed low and locked. Call light in reach.
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
## 2019-03-04 NOTE — SUBJECTIVE & OBJECTIVE
Interval History:  The patient is doing okay.  She feels weak.  She is frustrated because she has been so sick recently.    Review of Systems   Respiratory: Negative for cough, chest tightness, shortness of breath and wheezing.    Cardiovascular: Negative for chest pain, palpitations and leg swelling.   Gastrointestinal: Negative for abdominal distention, abdominal pain, constipation, diarrhea, nausea and vomiting.   Genitourinary: Negative for difficulty urinating, dysuria and flank pain.   Musculoskeletal: Negative for gait problem, joint swelling and neck pain.   Skin: Negative for rash and wound.   Neurological: Positive for weakness. Negative for dizziness, syncope, light-headedness and headaches.   Psychiatric/Behavioral: Negative for agitation, behavioral problems and confusion.     Objective:     Vital Signs (Most Recent):  Temp: 98.6 °F (37 °C) (03/04/19 1123)  Pulse: (!) 55 (03/04/19 1123)  Resp: 18 (03/04/19 1123)  BP: (!) 149/67 (03/04/19 1123)  SpO2: (!) 94 % (03/04/19 1123) Vital Signs (24h Range):  Temp:  [96 °F (35.6 °C)-98.6 °F (37 °C)] 98.6 °F (37 °C)  Pulse:  [52-65] 55  Resp:  [12-18] 18  SpO2:  [94 %-100 %] 94 %  BP: (119-149)/(58-72) 149/67     Weight: 51.7 kg (114 lb)  Body mass index is 18.97 kg/m².    Intake/Output Summary (Last 24 hours) at 3/4/2019 1242  Last data filed at 3/4/2019 0500  Gross per 24 hour   Intake --   Output 2200 ml   Net -2200 ml      Physical Exam   Constitutional: She is oriented to person, place, and time. No distress.   Neck: Normal range of motion. Neck supple. No JVD present. No tracheal deviation present.   Cardiovascular: Normal rate, regular rhythm, normal heart sounds and intact distal pulses.   Pulmonary/Chest: Effort normal and breath sounds normal.   The patient has clubbing in her hands   Abdominal: Soft. Bowel sounds are normal. She exhibits no distension. There is no tenderness.   Musculoskeletal: Normal range of motion.   Neurological: She is alert and  oriented to person, place, and time.   Skin: Skin is warm and dry. Capillary refill takes less than 2 seconds.   Sacral dressing CDI.   Psychiatric: She has a normal mood and affect. Her behavior is normal. Judgment and thought content normal.       Significant Labs:   BMP:   Recent Labs   Lab 03/04/19  0423   GLU 73      K 4.4      CO2 27   BUN 13   CREATININE 1.0   CALCIUM 7.5*   MG 1.7     CBC:   Recent Labs   Lab 03/03/19  0541 03/04/19  0423   WBC 5.70 5.00   HGB 8.7* 9.2*   HCT 27.1* 28.6*    205       Significant Imaging: I have reviewed all pertinent imaging results/findings within the past 24 hours.
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
## 2019-03-04 NOTE — ASSESSMENT & PLAN NOTE
Patient's FSGs are controlled on current hypoglycemics.   Last A1c reviewed-   Lab Results   Component Value Date    HGBA1C 6.0 (H) 09/22/2018     Most recent fingerstick glucose reviewed-   Recent Labs   Lab 03/03/19  1722 03/04/19  1056   POCTGLUCOSE 98 220*     Current correctional scale  Low  Maintain anti-hyperglycemic dose as follows-   Antihyperglycemics (From admission, onward)    None

## 2019-03-04 NOTE — PLAN OF CARE
Sacral decubitus ; present on admit; unstageable due to eschar and slough:see photo. Recommend wash daily with wound cleanser; pack lightly with Restore silver dressing, and cover with border sacrum dressing.

## 2019-03-05 LAB
POCT GLUCOSE: 109 MG/DL (ref 70–110)
POCT GLUCOSE: 117 MG/DL (ref 70–110)
POCT GLUCOSE: 81 MG/DL (ref 70–110)
POCT GLUCOSE: 89 MG/DL (ref 70–110)

## 2019-03-05 PROCEDURE — 97161 PT EVAL LOW COMPLEX 20 MIN: CPT

## 2019-03-05 PROCEDURE — 99223 PR INITIAL HOSPITAL CARE,LEVL III: ICD-10-PCS | Mod: ,,, | Performed by: SURGERY

## 2019-03-05 PROCEDURE — 97803 MED NUTRITION INDIV SUBSEQ: CPT

## 2019-03-05 PROCEDURE — 27000221 HC OXYGEN, UP TO 24 HOURS

## 2019-03-05 PROCEDURE — 94640 AIRWAY INHALATION TREATMENT: CPT

## 2019-03-05 PROCEDURE — G8979 MOBILITY GOAL STATUS: HCPCS | Mod: CJ

## 2019-03-05 PROCEDURE — 63600175 PHARM REV CODE 636 W HCPCS: Performed by: NURSE PRACTITIONER

## 2019-03-05 PROCEDURE — 12000002 HC ACUTE/MED SURGE SEMI-PRIVATE ROOM

## 2019-03-05 PROCEDURE — 99900035 HC TECH TIME PER 15 MIN (STAT)

## 2019-03-05 PROCEDURE — 94761 N-INVAS EAR/PLS OXIMETRY MLT: CPT

## 2019-03-05 PROCEDURE — 25000003 PHARM REV CODE 250: Performed by: NURSE PRACTITIONER

## 2019-03-05 PROCEDURE — G8978 MOBILITY CURRENT STATUS: HCPCS | Mod: CM

## 2019-03-05 PROCEDURE — 25000003 PHARM REV CODE 250: Performed by: INTERNAL MEDICINE

## 2019-03-05 PROCEDURE — 99223 1ST HOSP IP/OBS HIGH 75: CPT | Mod: ,,, | Performed by: SURGERY

## 2019-03-05 RX ADMIN — ENOXAPARIN SODIUM 40 MG: 100 INJECTION SUBCUTANEOUS at 04:03

## 2019-03-05 RX ADMIN — GABAPENTIN 300 MG: 300 CAPSULE ORAL at 08:03

## 2019-03-05 RX ADMIN — PANTOPRAZOLE SODIUM 40 MG: 40 TABLET, DELAYED RELEASE ORAL at 08:03

## 2019-03-05 RX ADMIN — LEVOFLOXACIN 750 MG: 500 TABLET, FILM COATED ORAL at 08:03

## 2019-03-05 RX ADMIN — QUETIAPINE FUMARATE 200 MG: 100 TABLET ORAL at 08:03

## 2019-03-05 RX ADMIN — METRONIDAZOLE 500 MG: 500 TABLET ORAL at 05:03

## 2019-03-05 RX ADMIN — METRONIDAZOLE 500 MG: 500 TABLET ORAL at 02:03

## 2019-03-05 RX ADMIN — METRONIDAZOLE 500 MG: 500 TABLET ORAL at 08:03

## 2019-03-05 RX ADMIN — FLUTICASONE FUROATE AND VILANTEROL TRIFENATATE 1 PUFF: 100; 25 POWDER RESPIRATORY (INHALATION) at 07:03

## 2019-03-05 RX ADMIN — LEVOTHYROXINE SODIUM 100 MCG: 100 TABLET ORAL at 05:03

## 2019-03-05 NOTE — ASSESSMENT & PLAN NOTE
Patient's FSGs are controlled on current hypoglycemics.   Last A1c reviewed-   Lab Results   Component Value Date    HGBA1C 6.0 (H) 09/22/2018     Most recent fingerstick glucose reviewed-   Recent Labs   Lab 03/04/19  1056 03/04/19  1652 03/04/19  2035 03/05/19  0544   POCTGLUCOSE 220* 92 96 117*     Current correctional scale  Low  Maintain anti-hyperglycemic dose as follows-   Antihyperglycemics (From admission, onward)    None

## 2019-03-05 NOTE — PLAN OF CARE
Problem: Adult Inpatient Plan of Care  Goal: Plan of Care Review  Outcome: Ongoing (interventions implemented as appropriate)  Plan of care reviewed, patient verbalized understanding. Turn Q2H per orders, up to BSC with assistance. PIV CDI.  PO abx given per orders. VS addressed. Tele monitored, SR/SB. Blood glucose monitored. PT. Refusing any surgical intervention for hernia. Thicken liquids maintained. Wound to sacrum changed per orders. Pure wick in place. Remain afebrile throughout shift. Moderate pain control with PRN meds/pain pump in use. Bed in lowest position, SRx2, brakes locked, call light within reach. Hourly rounding and open communication utilized. Patient remains free from fall and injury. Will continue to monitor.
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
## 2019-03-05 NOTE — SUBJECTIVE & OBJECTIVE
Interval History:  The patient is doing okay except for back pain. She also does not like the dysphagia diet.    Review of Systems   Respiratory: Negative for cough, chest tightness, shortness of breath and wheezing.    Cardiovascular: Negative for chest pain, palpitations and leg swelling.   Gastrointestinal: Negative for abdominal distention, abdominal pain, constipation, diarrhea, nausea and vomiting.   Genitourinary: Negative for difficulty urinating, dysuria and flank pain.   Musculoskeletal: Negative for gait problem, joint swelling and neck pain.   Skin: Negative for rash and wound.   Neurological: Positive for weakness. Negative for dizziness, syncope, light-headedness and headaches.   Psychiatric/Behavioral: Negative for agitation, behavioral problems and confusion.     Objective:     Vital Signs (Most Recent):  Temp: 97 °F (36.1 °C) (03/05/19 0710)  Pulse: (!) 55 (03/05/19 0737)  Resp: 15 (03/05/19 0737)  BP: (!) 161/73 (03/05/19 0710)  SpO2: 100 % (03/05/19 0737) Vital Signs (24h Range):  Temp:  [96.5 °F (35.8 °C)-98.6 °F (37 °C)] 97 °F (36.1 °C)  Pulse:  [55-66] 55  Resp:  [15-18] 15  SpO2:  [92 %-100 %] 100 %  BP: (115-171)/(58-75) 161/73     Weight: 51.7 kg (114 lb)  Body mass index is 18.97 kg/m².    Intake/Output Summary (Last 24 hours) at 3/5/2019 1019  Last data filed at 3/5/2019 0600  Gross per 24 hour   Intake 1010 ml   Output 2400 ml   Net -1390 ml      Physical Exam   Constitutional: She is oriented to person, place, and time. No distress.   Neck: Normal range of motion. Neck supple. No JVD present. No tracheal deviation present.   Cardiovascular: Normal rate, regular rhythm, normal heart sounds and intact distal pulses.   Pulmonary/Chest: Effort normal and breath sounds normal.   The patient has clubbing in her hands   Abdominal: Soft. Bowel sounds are normal. She exhibits no distension. There is no tenderness.   Musculoskeletal: Normal range of motion.   Neurological: She is alert and  oriented to person, place, and time.   Skin: Skin is warm and dry. Capillary refill takes less than 2 seconds.   Sacral dressing CDI.   Psychiatric: She has a normal mood and affect. Her behavior is normal. Judgment and thought content normal.   Nursing note and vitals reviewed.      Significant Labs:   BMP:   Recent Labs   Lab 03/04/19  0423   GLU 73      K 4.4      CO2 27   BUN 13   CREATININE 1.0   CALCIUM 7.5*   MG 1.7     CBC:   Recent Labs   Lab 03/04/19  0423   WBC 5.00   HGB 9.2*   HCT 28.6*          Significant Imaging: I have reviewed all pertinent imaging results/findings within the past 24 hours.

## 2019-03-05 NOTE — PROGRESS NOTES
[facility] Hospital Medicine  Progress Note    Patient Name: Shazia Stacy  MRN: [id number]  Patient Class: IP- Inpatient   Admission Date: 3/1/2019  Length of Stay: 4 days  Attending Physician: Keisha Cook MD  Primary Care Provider: Nikhil Albrecht MD        Subjective:     Principal Problem:RLL pneumonia    HPI:  Ms. Stacy is an 85yo F with a PMH of DM, thyroid disease, diverticulitis, Crohn's disease, HLD, and COPD. She was discharged from [facility] 1.5 weeks ago for pneumonia, after being there for 2 weeks. She presents to the ED with c/o fever. Her daughter reports that it was 103.0 at home. Her appetite has been good. She had her sacral wound debrided 2/26. While in the ED, her CXR showed a RLL pneumonia and IV Azactam, Azithromycin, and Vancomycin were given. Her daughter is at bedside and is the primary historian. She requests IV tylenol for pain. Code status discussed and she requests a DNR status.         Hospital Course:  No notes on file    Interval History:  The patient is doing okay except for back pain. She also does not like the dysphagia diet.    Review of Systems   Respiratory: Negative for cough, chest tightness, shortness of breath and wheezing.    Cardiovascular: Negative for chest pain, palpitations and leg swelling.   Gastrointestinal: Negative for abdominal distention, abdominal pain, constipation, diarrhea, nausea and vomiting.   Genitourinary: Negative for difficulty urinating, dysuria and flank pain.   Musculoskeletal: Negative for gait problem, joint swelling and neck pain.   Skin: Negative for rash and wound.   Neurological: Positive for weakness. Negative for dizziness, syncope, light-headedness and headaches.   Psychiatric/Behavioral: Negative for agitation, behavioral problems and confusion.     Objective:     Vital Signs (Most Recent):  Temp: 97 °F (36.1 °C) (03/05/19 0710)  Pulse: (!) 55 (03/05/19 0737)  Resp: 15 (03/05/19 0737)  BP: (!) 161/73 (03/05/19 0710)  SpO2:  100 % (03/05/19 0737) Vital Signs (24h Range):  Temp:  [96.5 °F (35.8 °C)-98.6 °F (37 °C)] 97 °F (36.1 °C)  Pulse:  [55-66] 55  Resp:  [15-18] 15  SpO2:  [92 %-100 %] 100 %  BP: (115-171)/(58-75) 161/73     Weight: 51.7 kg (114 lb)  Body mass index is 18.97 kg/m².    Intake/Output Summary (Last 24 hours) at 3/5/2019 1019  Last data filed at 3/5/2019 0600  Gross per 24 hour   Intake 1010 ml   Output 2400 ml   Net -1390 ml      Physical Exam   Constitutional: She is oriented to person, place, and time. No distress.   Neck: Normal range of motion. Neck supple. No JVD present. No tracheal deviation present.   Cardiovascular: Normal rate, regular rhythm, normal heart sounds and intact distal pulses.   Pulmonary/Chest: Effort normal and breath sounds normal.   The patient has clubbing in her hands   Abdominal: Soft. Bowel sounds are normal. She exhibits no distension. There is no tenderness.   Musculoskeletal: Normal range of motion.   Neurological: She is alert and oriented to person, place, and time.   Skin: Skin is warm and dry. Capillary refill takes less than 2 seconds.   Sacral dressing CDI.   Psychiatric: She has a normal mood and affect. Her behavior is normal. Judgment and thought content normal.   Nursing note and vitals reviewed.      Significant Labs:   BMP:   Recent Labs   Lab 03/04/19  0423   GLU 73      K 4.4      CO2 27   BUN 13   CREATININE 1.0   CALCIUM 7.5*   MG 1.7     CBC:   Recent Labs   Lab 03/04/19  0423   WBC 5.00   HGB 9.2*   HCT 28.6*          Significant Imaging: I have reviewed all pertinent imaging results/findings within the past 24 hours.    Assessment/Plan:      * RLL pneumonia    The patient is improving.   She is now on oral antibiotics.  She is on a dysphagia diet.    She was evaluated by Dr. Mares regarding the hiatal hernia.  She does not want further workup or surgery.       Type 2 diabetes mellitus with other specified complication    Patient's FSGs are  controlled on current hypoglycemics.   Last A1c reviewed-   Lab Results   Component Value Date    HGBA1C 6.0 (H) 09/22/2018     Most recent fingerstick glucose reviewed-   Recent Labs   Lab 03/04/19  1056 03/04/19  1652 03/04/19  2035 03/05/19  0544   POCTGLUCOSE 220* 92 96 117*     Current correctional scale  Low  Maintain anti-hyperglycemic dose as follows-   Antihyperglycemics (From admission, onward)    None               Chronic respiratory failure on home O2/ COPD    Continue supplemental O2 and nebulizers       Hiatal hernia    No surgical intervention required at this time       Pressure injury of sacral region, stage 3    Debrided 2/26 per Dr. Castellano  Wound care consult  Turn Q2H     Chronic pain syndrome    Has implanted dilaudid pain pump       Dementia    Stable. Continue Seroquel.  Delirium precautions.       Debility    PT/OT consults  Fall precautions       Hyperlipidemia associated with type 2 diabetes mellitus    Continue Zetia and statin  Not on chronic ASA       Hypothyroidism    Chronic. Continue home levothyroxine dose.  Lab Results   Component Value Date    TSH 2.696 02/08/2016            Crohn's disease without complication    Chronic/stable  On Cimzia         VTE Risk Mitigation (From admission, onward)        Ordered     enoxaparin injection 40 mg  Daily      03/01/19 2253     IP VTE HIGH RISK PATIENT  Once      03/01/19 2253              Keisha Cook MD  Department of Hospital Medicine   [facility]

## 2019-03-05 NOTE — PROGRESS NOTES
03/05/19 0737   Patient Assessment/Suction   Level of Consciousness (AVPU) alert   PRE-TX-O2   O2 Device (Oxygen Therapy) nasal cannula   $ Is the patient on Low Flow Oxygen? Yes   Flow (L/min) 2   SpO2 100 %   Pulse Oximetry Type Intermittent   $ Pulse Oximetry - Multiple Charge Pulse Oximetry - Multiple   Pulse (!) 55   Resp 15   Aerosol Therapy   $ Aerosol Therapy Charges PRN treatment not required   Inhaler   $ Inhaler Charges MDI (Metered Dose Inahler) Treatment   Respiratory Treatment Status (Inhaler) given   Treatment Route (Inhaler) mouthpiece   Patient Position (Inhaler) HOB elevated   Signs of Intolerance (Inhaler) none

## 2019-03-05 NOTE — PLAN OF CARE
Problem: Malnutrition  Goal: Improved Nutritional Intake    Intervention: Promote and Optimize Oral Intake  Intervention: carbohydrate/fat/sodium/ texture modified diet and nutrition supplement therapy-commercial beverage     Recommendation:   1) Rec. DM diet 3-4 carb servings/meal no calorie restriction, cardiac diet texture per SLP  2) Allow whole milk (pt adamantly wants with cereal)   3) Thicken all drinks and supplements- provide supervision with meals and assistance taking lids off of foods  4) Glucerna butter pecan BID per pt preference- if pt dislikes at f/u add ice cream  5) obtain measured weight on pt     Goals: 1) PO intakes > 50% of meals and supplements at f/u 2) Pt will drink 2 supplements daily  Nutrition Goal Status: Met  Communication of RD Recs: (plan of care, sticky note, second sign)

## 2019-03-05 NOTE — ASSESSMENT & PLAN NOTE
No abdominal pain or chest pain- no acute surgical intervention needed.  Suspect hiatal hernia may be causing aspiration leading to pneumonias.  I would like to get a barium UGI; however, patient refusing any work up and does not want surgery.  I explained that this may result in further pneumonia and may eventually need emergent surgery if the stomach twists.  She still does not want surgery despite the risks but will consider this should she continue to have pneumonias or if starts having pain.

## 2019-03-05 NOTE — PLAN OF CARE
Problem: Adult Inpatient Plan of Care  Goal: Plan of Care Review  Outcome: Ongoing (interventions implemented as appropriate)  Bed is in low position, bed alarm set, SR up x 2, call light is within reach, patient instructed to use call light for assistance, VSS, Cardiac monitored SR/SB, Glucose monitored within limits.  Patient became very anxious and frustrated, complained of pain in sacral area, STAT order for gabapentin was ordered per patient/daughter request, medication given, also PRN anxiety and sleep medication given.  Patient refused to have liquids thickened for sips w/meds. Pure wick  cath placed for frequent occurrences of urine.  Sacral wound cleansed with wound cleanser, restore silver dressing/sacrum dressing placed.  Patient is resting between care, will continue to monitor and round.

## 2019-03-05 NOTE — PROGRESS NOTES
"[facility]  Adult Nutrition  Progress Note    SUMMARY     Intervention: carbohydrate/fat/sodium/ texture modified diet and nutrition supplement therapy-commercial beverage     Recommendation:   1) Rec. DM diet 3-4 carb servings/meal no calorie restriction, cardiac diet texture per SLP  2) Allow whole milk (pt adamantly wants with cereal)   3) Thicken all drinks and supplements- provide supervision with meals and assistance taking lids off of foods  4) Glucerna butter pecan BID per pt preference- if pt dislikes at f/u add ice cream  5) obtain measured weight on pt     Goals: 1) PO intakes > 50% of meals and supplements at f/u 2) Pt will drink 2 supplements daily  Nutrition Goal Status: Met  Communication of RD Recs: (plan of care, sticky note, second sign)     D/C planning: To be determined- DM diet 3-4 carb servings/meal no calorie restriction, cardiac diet + Glucerna 2-3 x dialy     Reason for Assessment     Reason for Assessment: RD follow up  Diagnosis: RLL pneumonia  Relevant Medical History: DM, Chrohn's, Diverticulitis, GERD, HLD, COPD; pain pump, dementia, severe malnutrition.     General Information Comments: 85 y/o female admitted with pneumonia. With sacral wound stage 3. Reports good appetite PTA and PO intake 75% of 1 meal inpatient.  NFPE revealed significant fat/muscle loss, continues with severe malnutrition. No measured weight this admission. Will perform nutrition education at f/u when able to discuss with daughter.  3/5/19 Pt very forgetful, forgot our conversation from this morning. Demanding certain things such as wanted a cup for her water, discussed texture restraints per SLP and pt said," Fine, then I'll just drink it out of the jug," and started trying to drink the thin water from the bedside jug. Removed jug from reach, and informed RN that pt needs all drinks and supplements thickened. Pt needs supervision with meals and helps setting up trays/taking lids off. Ate 50% of " "lunch and stated she liked the butter pecan shake, dislikes Boost glucose control.      Nutrition Risk Screen     Nutrition Risk Screen: no indicators present     Nutrition/Diet History     Food Preferences: Reports pt usually eats cereal or oatmeal for breakfast, around 10 am sweet potato with vegetables, around 1-2 peanut butter sandwich and occasionally eats out at [employer] or [employer].  Notes that pt does not like to eat past then as it affects her hiatal hernia and she is uncomfortable.   Do you have any cultural, spiritual, Hindu conflicts, given your current situation?: Quaker, no blood  Food Allergies: (noted latex (occassionally foods restricted with latex))  Factors Affecting Nutritional Intake: None at this time     Anthropometrics     Height Method: Stated  Height: 5' 6" (167.6 cm)  Height (inches): 66 in  Weight Method: Stated  Weight: 51.7 3/1/19 ( 47.2 kg12/20/18, no new)  Weight (lb): 114 lb  Ideal Body Weight (IBW), Female: 130 lb  % Ideal Body Weight, Female (lb): 80.38 lb  BMI (Calculated): 18.9 kg/m2 stated wt  BMI Grade: underweight for age  Weight Loss: 21% x 5 months- ? Continues (59.5 kg 4/2018)        Lab/Procedures/Meds     Pertinent Labs Reviewed: reviewed  Lab Results   Component Value Date    ALBUMIN 2.5 (L) 03/01/2019     BMP  Lab Results   Component Value Date     03/04/2019    K 4.4 03/04/2019     03/04/2019    CO2 27 03/04/2019    BUN 13 03/04/2019    CREATININE 1.0 03/04/2019    CALCIUM 7.5 (L) 03/04/2019    ANIONGAP 6 (L) 03/04/2019    ESTGFRAFRICA 60 03/04/2019    EGFRNONAA 52 (A) 03/04/2019     POCT Glucose   Date Value Ref Range Status   03/05/2019 89 70 - 110 mg/dL Final   03/05/2019 117 (H) 70 - 110 mg/dL Final   03/04/2019 96 70 - 110 mg/dL Final   03/04/2019 92 70 - 110 mg/dL Final   03/04/2019 220 (H) 70 - 110 mg/dL Final   03/03/2019 98 70 - 110 mg/dL Final   03/03/2019 159 (H) 70 - 110 mg/dL Final   03/02/2019 152 (H) 70 - 110 mg/dL Final "     Pertinent Medications Reviewed: reviewed  Pertinent Medications Comments: MDI, zofran, senna     Estimated/Assessed Needs   Admission  Weight Used For Calorie Calculations: 47 kg (last measured weight 12/20/18)  Energy Calorie Requirements (kcal): 1410 (RMR x 1.5 wt gain)  Energy Need Method: Kidder-St Jeor  Protein Requirements: 57-66 g (1.2-1.4g/kg)  Weight Used For Protein Calculations: 47.4 kg (104 lb 8 oz)     Fluid Need Method: RDA Method  RDA Method (mL): 1410  CHO Requirement: 45-50% EEN        Nutrition Prescription Ordered     Current Diet Order: DM diet 3-4 carb servings/meal, cardiac, mechanical soft, nectar thick liquids  Glucerna BID     Evaluation of Received Nutrient/Fluid Intake   Energy Calories Required: Meeting needs  Protein Required: not meeting needs  Fluid Required: exceeding needs  % Energy Intake: % (most 80% -with 1 glucerna shake)  % Meal Intake: %     Nutrition Risk     Level of Risk/Frequency of Follow-up: moderate (2 x weekly until appetite improves)      Assessment and Plan      Severe malnutrition    Contributing Nutrition Diagnosis  Severe malnutrition in context of acute on chronic illness     Related to (etiology):   Decreased appetite     Signs and Symptoms (as evidenced by):   1) Severe fat/muscle depletion noted around temples, eyes, depressions on dorsal area of hand, protruding clavicle, and in upper arm  2) 21% wt loss x 5 months (4-9 2018)    3) stage 3 PI on sacrum     Interventions/Recommendations (treatment strategy):  1) DM/cardiac diet no calorie restriction, texture per SLP 2) Glucerna BID-pt preference 3) nutrition education on weight gain given, pt forgetful     Nutrition Diagnosis Status:   Continues          Type 2 diabetes mellitus with other specified complication    Contributing Nutrition Diagnosis  Altered Nutrition Related Lab Values    Related to (etiology):   Altered Absorption of nutrients and non-compliance with nutrition related  recommendations    Signs and Symptoms (as evidenced by):   Elevated glucose    Interventions/Recommendations (treatment strategy):  Diabetic diet 3-4 carb servings at meals, Glucerna BID    Nutrition Diagnosis Status:   Continues         Malnutrition Assessment  Malnutrition Type: chronic illness  Energy Intake: severe energy intake  Skin (Micronutrient): (adonis = 15, stage 3 PI)  Teeth (Micronutrient): (present)       Weight Loss (Malnutrition): (21% x 5 months)   Orbital Region (Subcutaneous Fat Loss): severe depletion  Upper Arm Region (Subcutaneous Fat Loss): severe depletion   [place] Region (Muscle Loss): severe depletion  Clavicle Bone Region (Muscle Loss): severe depletion  Dorsal Hand (Muscle Loss): severe depletion   Edema (Fluid Accumulation): 0-->no edema present   Subcutaneous Fat Loss (Final Summary): severe protein-calorie malnutrition  Muscle Loss Evaluation (Final Summary): severe protein-calorie malnutrition    Severe Weight Loss (Malnutrition): greater than 10% in 6 months    Monitor and Evaluation     Food and Nutrient Intake: energy intake, food and beverage intake  Food and Nutrient Adminstration: diet order  Physical Activity and Function: nutrition-related ADLs and IADLs  Anthropometric Measurements: weight  Biochemical Data, Medical Tests and Procedures: electrolyte and renal panel, glucose/endocrine profile, albumin  Nutrition-Focused Physical Findings: overall appearance, skin     Nutrition Follow-Up     RD Follow-up?: Yes

## 2019-03-05 NOTE — ASSESSMENT & PLAN NOTE
The patient is improving.   She is now on oral antibiotics.  She is on a dysphagia diet.    She was evaluated by Dr. Mares regarding the hiatal hernia.  She does not want further workup or surgery.

## 2019-03-06 VITALS
HEART RATE: 59 BPM | HEIGHT: 65 IN | BODY MASS INDEX: 18.99 KG/M2 | WEIGHT: 114 LBS | SYSTOLIC BLOOD PRESSURE: 153 MMHG | RESPIRATION RATE: 16 BRPM | OXYGEN SATURATION: 98 % | TEMPERATURE: 97 F | DIASTOLIC BLOOD PRESSURE: 71 MMHG

## 2019-03-06 LAB
POCT GLUCOSE: 122 MG/DL (ref 70–110)
POCT GLUCOSE: 75 MG/DL (ref 70–110)

## 2019-03-06 PROCEDURE — 25000003 PHARM REV CODE 250: Performed by: INTERNAL MEDICINE

## 2019-03-06 PROCEDURE — G8978 MOBILITY CURRENT STATUS: HCPCS | Mod: CK | Performed by: PHYSICAL THERAPIST

## 2019-03-06 PROCEDURE — 27000221 HC OXYGEN, UP TO 24 HOURS

## 2019-03-06 PROCEDURE — 99900035 HC TECH TIME PER 15 MIN (STAT)

## 2019-03-06 PROCEDURE — 94640 AIRWAY INHALATION TREATMENT: CPT

## 2019-03-06 PROCEDURE — 25000003 PHARM REV CODE 250: Performed by: NURSE PRACTITIONER

## 2019-03-06 PROCEDURE — 97116 GAIT TRAINING THERAPY: CPT | Performed by: PHYSICAL THERAPIST

## 2019-03-06 PROCEDURE — G8979 MOBILITY GOAL STATUS: HCPCS | Mod: CJ | Performed by: PHYSICAL THERAPIST

## 2019-03-06 PROCEDURE — 94761 N-INVAS EAR/PLS OXIMETRY MLT: CPT

## 2019-03-06 RX ORDER — METRONIDAZOLE 500 MG/1
500 TABLET ORAL EVERY 8 HOURS
Qty: 15 TABLET | Refills: 0 | Status: SHIPPED | OUTPATIENT
Start: 2019-03-06 | End: 2019-03-11

## 2019-03-06 RX ORDER — ENALAPRIL MALEATE 5 MG/1
5 TABLET ORAL DAILY
Status: DISCONTINUED | OUTPATIENT
Start: 2019-03-06 | End: 2019-03-06 | Stop reason: HOSPADM

## 2019-03-06 RX ORDER — LEVOFLOXACIN 750 MG/1
750 TABLET ORAL EVERY OTHER DAY
Qty: 2 TABLET | Refills: 0 | Status: SHIPPED | OUTPATIENT
Start: 2019-03-08 | End: 2019-03-13

## 2019-03-06 RX ADMIN — PANTOPRAZOLE SODIUM 40 MG: 40 TABLET, DELAYED RELEASE ORAL at 07:03

## 2019-03-06 RX ADMIN — ENALAPRIL MALEATE 5 MG: 5 TABLET ORAL at 12:03

## 2019-03-06 RX ADMIN — FLUTICASONE FUROATE AND VILANTEROL TRIFENATATE 1 PUFF: 100; 25 POWDER RESPIRATORY (INHALATION) at 07:03

## 2019-03-06 RX ADMIN — METRONIDAZOLE 500 MG: 500 TABLET ORAL at 05:03

## 2019-03-06 RX ADMIN — LEVOTHYROXINE SODIUM 100 MCG: 100 TABLET ORAL at 05:03

## 2019-03-06 RX ADMIN — METRONIDAZOLE 500 MG: 500 TABLET ORAL at 12:03

## 2019-03-06 NOTE — PLAN OF CARE
Scheduled hospital f/u with PCP office; placed on AVS.       03/06/19 1009   Discharge Reassessment   Assessment Type Discharge Planning Reassessment

## 2019-03-06 NOTE — PLAN OF CARE
Problem: Adult Inpatient Plan of Care  Goal: Plan of Care Review  Patient oriented to person and place.  Sinus delma rhythm.  Diabetes management.  Monitoring urine output via Pure Wick Suction.  Call light in reach.  Bed alarm on for patient's safety.  No complaints at this time.

## 2019-03-06 NOTE — PROGRESS NOTES
03/06/19 0732   Patient Assessment/Suction   Level of Consciousness (AVPU) alert   PRE-TX-O2   O2 Device (Oxygen Therapy) nasal cannula   $ Is the patient on Low Flow Oxygen? Yes   Flow (L/min) 2   SpO2 98 %   Pulse Oximetry Type Intermittent   $ Pulse Oximetry - Multiple Charge Pulse Oximetry - Multiple   Pulse (!) 59   Resp 16   Aerosol Therapy   $ Aerosol Therapy Charges PRN treatment not required   Inhaler   $ Inhaler Charges MDI (Metered Dose Inahler) Treatment   Respiratory Treatment Status (Inhaler) given   Treatment Route (Inhaler) mouthpiece   Patient Position (Inhaler) HOB elevated   Signs of Intolerance (Inhaler) none

## 2019-03-06 NOTE — PLAN OF CARE
Problem: Physical Therapy Goal  Goal: Physical Therapy Goal  1.Pt will be independent with bed mobility and transfers.  2.Pt will ambulate 250 with AD with CGA.   Outcome: Ongoing (interventions implemented as appropriate)  POC carried out, gait and transfer training progressed

## 2019-03-06 NOTE — DISCHARGE SUMMARY
[facility] Hospital Medicine  Discharge Summary      Patient Name: Shazia Stacy  MRN: [id number]  Admission Date: 3/1/2019  Hospital Length of Stay: 5 days  Discharge Date and Time:  03/06/2019 2:08 PM  Attending Physician: No att. providers found   Discharging Provider: Keisha Cook MD  Primary Care Provider: Nikhil Albrecht MD      HPI:   Ms. Stacy is an 85yo F with a PMH of DM, thyroid disease, diverticulitis, Crohn's disease, HLD, and COPD. She was discharged from [facility] 1.5 weeks ago for pneumonia, after being there for 2 weeks. She presents to the ED with c/o fever. Her daughter reports that it was 103.0 at home. Her appetite has been good. She had her sacral wound debrided 2/26. While in the ED, her CXR showed a RLL pneumonia and IV Azactam, Azithromycin, and Vancomycin were given. Her daughter is at bedside and is the primary historian. She requests IV tylenol for pain. Code status discussed and she requests a DNR status.         * No surgery found *      Hospital Course:   The patient is a very pleasant 84-year-old woman who was admitted to the hospital for treatment of recurrent pneumonia.  She was placed on broad-spectrum antibiotics and improved.  She is now on oral Levaquin and Flagyl since her pneumonia is most likely secondary to aspiration.    The patient was evaluated by speech therapy and placed on a dysphagia diet.    I obtained a CT scan of the chest which showed infiltrates in both lungs and a huge hiatal hernia with possible gastric volvulus.  I consulted Dr. Mares about this.  He saw the patient and recommended treatment of this hernia once the pneumonia has resolved.    The remainder of her chronic problems were stable.  She will be discharged home with home health.  Her daughter is her main caregiver.     Consults:   Consults (From admission, onward)        Status Ordering Provider     Inpatient consult to General Surgery  Once     Provider:  Jose Mares MD     Completed ELIESER JAIME          No new Assessment & Plan notes have been filed under this hospital service since the last note was generated.  Service: Hospital Medicine    Final Active Diagnoses:    Diagnosis Date Noted POA    PRINCIPAL PROBLEM:  RLL pneumonia [J18.1] 03/01/2019 Yes    Type 2 diabetes mellitus with other specified complication [E11.69] 11/18/2012 Yes    Chronic respiratory failure on home O2/ COPD [J96.10] 11/28/2015 Yes    Hiatal hernia [K44.9] 04/04/2018 Yes    Pressure injury of sacral region, stage 3 [L89.153] 03/01/2019 Yes    Severe malnutrition [E43] 04/05/2018 Yes    Dementia [F03.90] 04/04/2018 Yes    Debility [R53.81] 04/04/2018 Yes    Chronic pain syndrome [G89.4] 04/04/2018 Yes    Hypothyroidism [E03.9] 12/21/2015 Yes    Hyperlipidemia associated with type 2 diabetes mellitus [E11.69, E78.5] 12/21/2015 Yes    Crohn's disease without complication [K50.90] 11/18/2012 Yes      Problems Resolved During this Admission:       Discharged Condition: stable    Disposition: Home-Health Care [facility]    Follow Up:  Follow-up Information     Nikhil Albrecht MD On 3/13/2019.    Specialty:  Internal Medicine  Why:  @ 11:45  Contact information:  [address]  [facility] [address]  [phone]             Jose Mares MD In 3 weeks.    Specialties:  General Surgery, Bariatrics, Surgery  Contact information:  [address]    [place] [address]  [phone]             [facility].    Specialty:  Home Health Services  Why:  Home Health  Contact information:  [facility] [place] [address]  [phone]                 Patient Instructions:      Referral to Home health   Referral Priority: Routine Referral Type: Home Health   Referral Reason: Specialty Services Required   Requested Specialty: Home Health Services   Number of Visits Requested: 1     Diet Cardiac     Activity as tolerated       Significant Diagnostic Studies: Labs: BMP: No  results for input(s): GLU, NA, K, CL, CO2, BUN, CREATININE, CALCIUM, MG in the last 48 hours. and CBC No results for input(s): WBC, HGB, HCT, PLT in the last 48 hours.    Pending Diagnostic Studies:     None         Medications:  Reconciled Home Medications:      Medication List      START taking these medications    levoFLOXacin 750 MG tablet  Commonly known as:  LEVAQUIN  Take 1 tablet (750 mg total) by mouth every other day. for 5 days  Start taking on:  3/8/2019     metroNIDAZOLE 500 MG tablet  Commonly known as:  FLAGYL  Take 1 tablet (500 mg total) by mouth every 8 (eight) hours. for 5 days        CONTINUE taking these medications    BREO ELLIPTA 100-25 mcg/dose diskus inhaler  Generic drug:  fluticasone-vilanterol  Inhale 1 puff into the lungs once daily.     CIMZIA 400 mg/2 mL (200 mg/mL x 2) Sykt  400 mg every 30 days.     enalapril 5 MG tablet  Commonly known as:  VASOTEC  Take 5 mg by mouth once daily.     gabapentin 300 MG capsule  Commonly known as:  NEURONTIN  Take 300 mg by mouth every evening.     HYDROmorphone 2 MG tablet  Commonly known as:  DILAUDID  Pain pump     levothyroxine 100 MCG tablet  Commonly known as:  SYNTHROID  Take 100 mcg by mouth before breakfast.     LORazepam 1 MG tablet  Commonly known as:  ATIVAN  Take 1 mg by mouth nightly as needed for Anxiety.     ondansetron 8 MG Tbdl  Commonly known as:  ZOFRAN-ODT  Take 8 mg by mouth every 12 (twelve) hours as needed (nausea / vomiting).     pantoprazole 40 MG tablet  Commonly known as:  PROTONIX  Take 40 mg by mouth once daily.     quetiapine 200 mg oral tb24 200 MG Tb24  Commonly known as:  SEROQUEL XR  Take 200 mg by mouth every evening.     simvastatin 40 MG tablet  Commonly known as:  ZOCOR  Take 40 mg by mouth every evening.     temazepam 30 mg capsule  Commonly known as:  RESTORIL  Take 30 mg by mouth every evening.     ZETIA 10 mg tablet  Generic drug:  ezetimibe  Take 10 mg by mouth once daily.            Indwelling Lines/Drains  at time of discharge:   Lines/Drains/Airways     Drain            Female External Urinary Catheter 03/05/19 0751 1 day          Line                 Subcutaneous Infusion Pump Abdomen (Comment) -- days          Pressure Ulcer                 Pressure Injury 02/26/19 Sacral spine 8 days         Pressure Injury 03/04/19 1719 lower Sacral spine #1 Unstageable - Present on Admission 1 day                Time spent on the discharge of patient: 30 minutes  Patient was seen and examined on the date of discharge and determined to be suitable for discharge.         Keisha Cook MD  Department of Hospital Medicine  [facility]

## 2019-03-06 NOTE — PLAN OF CARE
03/06/19 1113   Final Note   Assessment Type Final Discharge Note   Anticipated Discharge Disposition Home-Health   Hospital Follow Up  Appt(s) scheduled? Yes

## 2019-03-06 NOTE — PT/OT/SLP PROGRESS
Physical Therapy  Treatment    Shazia Stacy   MRN: [id number]   Admitting Diagnosis: RLL pneumonia    PT Received On: 03/06/19  PT Start Time: 1039     PT Stop Time: 1059    PT Total Time (min): 20 min       Billable Minutes:  Gait Training 20    Treatment Type: Treatment  PT/PTA: PT             General Precautions: Standard, fall  Orthopedic Precautions: N/A   Braces: N/A    Spiritual, Cultural Beliefs, Baptist Practices, Values that Affect Care: no    Subjective:  Communicated with nurse Byers prior to session.  Pt stated that she was having pain over her sacral wound.    Pain/Comfort  Pain Rating 1: 8/10  Location - Orientation 1: generalized  Location 1: sacral spine  Pain Addressed 1: Reposition, Distraction    Objective:   Patient found with: Caryl, telemetry, peripheral IV    Functional Mobility:  Bed Mobility: Pt performed all bed mobility with CGA and was independent for scooting to EOB.       Transfers: Pt performed sit <> stand transfer with CGA and RW. Stand <> sit transfer performed with CGA and RW       Gait: Pt ambulated 40 feet with CGA and RW       Balance:   Static Sit: FAIR+: Able to take MINIMAL challenges from all directions  Dynamic Sit: FAIR+: Maintains balance through MINIMAL excursions of active trunk motion  Static Stand: FAIR: Maintains without assist but unable to take challenges  Dynamic stand: FAIR: Needs CONTACT GUARD during gait     AM-PAC 6 CLICK MOBILITY  How much help from another person does this patient currently need?   1 = Unable, Total/Dependent Assistance  2 = A lot, Maximum/Moderate Assistance  3 = A little, Minimum/Contact Guard/Supervision  4 = None, Modified Tucker/Independent    Turning over in bed (including adjusting bedclothes, sheets and blankets)?: 4  Sitting down on and standing up from a chair with arms (e.g., wheelchair, bedside commode, etc.): 3  Moving from lying on back to sitting on the side of the bed?: 3  Moving to and from a bed to a chair  (including a wheelchair)?: 3  Need to walk in hospital room?: 3  Climbing 3-5 steps with a railing?: 3  Basic Mobility Total Score: 19    AM-PAC Raw Score CMS G-Code Modifier Level of Impairment Assistance   6 % Total / Unable   7 - 9 CM 80 - 100% Maximal Assist   10 - 14 CL 60 - 80% Moderate Assist   15 - 19 CK 40 - 60% Moderate Assist   20 - 22 CJ 20 - 40% Minimal Assist   23 CI 1-20% SBA / CGA   24 CH 0% Independent/ Mod I     Patient left supine with all lines intact and call button in reach.    Assessment:  Shazia Stacy is a 84 y.o. female with a medical diagnosis of RLL pneumonia and presents with weakness, impaired endurance and decreased balance.  Pt was able to participate in skilled PT and was able to tolerate treatment. Pt was able to get to EOB with CGA and was able to independently scoot to EOB. Pt was able to perform sit <> stand with contact guard and RW and then was able to ambulate 40 feet with CGA using a RW. Pt returned to the bed where she needed some assistance to get back into the bed. Pt was left with call button in reach, all needs within reach and bed alarm on.    Rehab identified problem list/impairments: Rehab identified problem list/impairments: weakness, impaired endurance, impaired self care skills, impaired functional mobilty, gait instability, impaired balance    Rehab potential is good.    Activity tolerance: Fair    Discharge recommendations: Discharge Facility/Level of Care Needs: (HHPT)     Barriers to discharge:      Equipment recommendations: Equipment Needed After Discharge: walker, rolling     GOALS:   Multidisciplinary Problems     Physical Therapy Goals        Problem: Physical Therapy Goal    Goal Priority Disciplines Outcome Goal Variances Interventions   Physical Therapy Goal     PT, PT/OT Ongoing (interventions implemented as appropriate)     Description:  1.Pt will be independent with bed mobility and transfers.  2.Pt will ambulate 250 with AD with CGA.                     PLAN:    Patient to be seen 6 x/week  to address the above listed problems via gait training, therapeutic activities, therapeutic exercises  Plan of Care expires: 03/19/19  Plan of Care reviewed with: patient         Kermit Villa, RAFY  03/06/2019

## 2019-03-06 NOTE — PLAN OF CARE
The referral for the patient in [facility]/PCU, room 207, bed 207 B admitted at 3/1/2019 7:39 PM has been updated by Gabbi Jo.  Update: Accepted

## 2019-03-06 NOTE — HOSPITAL COURSE
The patient is a very pleasant 84-year-old woman who was admitted to the hospital for treatment of recurrent pneumonia.  She was placed on broad-spectrum antibiotics and improved.  She is now on oral Levaquin and Flagyl since her pneumonia is most likely secondary to aspiration.    The patient was evaluated by speech therapy and placed on a dysphagia diet.    I obtained a CT scan of the chest which showed infiltrates in both lungs and a huge hiatal hernia with possible gastric volvulus.  I consulted Dr. Mares about this.  He saw the patient and recommended treatment of this hernia once the pneumonia has resolved.    The remainder of her chronic problems were stable.  She will be discharged home with home health.  Her daughter is her main caregiver.

## 2019-03-06 NOTE — PLAN OF CARE
03/06/19 1116   Medicare Message   Important Message from Medicare regarding Discharge Appeal Rights Given to patient/caregiver;Explained to patient/caregiver;Signed/date by patient/caregiver   Date IMM was signed 03/06/19   Time IMM was signed 1119

## 2019-03-06 NOTE — PLAN OF CARE
Sent referral for home health to [facility], and [facility] via [facility].       03/06/19 1005   Post-Acute Status   Post-Acute Authorization Home Health/Hospice   Home Health/Hospice Status Referrals Sent

## 2019-03-07 ENCOUNTER — TELEPHONE (OUTPATIENT)
Dept: MEDSURG UNIT | Facility: HOSPITAL | Age: 84
End: 2019-03-07

## 2019-03-07 LAB
BACTERIA BLD CULT: NORMAL
BACTERIA BLD CULT: NORMAL

## 2019-03-16 ENCOUNTER — OUTSIDE PLACE OF SERVICE (OUTPATIENT)
Dept: UROLOGY | Facility: CLINIC | Age: 84
End: 2019-03-16
Payer: MEDICARE

## 2019-03-16 PROCEDURE — 99221 1ST HOSP IP/OBS SF/LOW 40: CPT | Mod: ,,, | Performed by: UROLOGY

## 2019-03-16 PROCEDURE — 99221 PR INITIAL HOSPITAL CARE,LEVL I: ICD-10-PCS | Mod: ,,, | Performed by: UROLOGY

## 2019-03-18 ENCOUNTER — TELEPHONE (OUTPATIENT)
Dept: ORTHOPEDICS | Facility: CLINIC | Age: 84
End: 2019-03-18

## 2019-03-18 NOTE — TELEPHONE ENCOUNTER
----- Message from Elis Christie sent at 3/18/2019  3:08 PM CDT -----  Sepideh pts daughter called patient went to the [facility] er on 3/15/19 for a fx clavicle when should she follow up with dr finger she already has an appt on 3/21/19 please call ms liu ph# [phone]

## 2019-03-18 NOTE — TELEPHONE ENCOUNTER
Spoke with Sepideh, pt fell on Friday and broke her clavicle. She did have a return appointment on Thursday but move it to tomorrow as she is not sure how bad it is.

## 2019-03-19 ENCOUNTER — OFFICE VISIT (OUTPATIENT)
Dept: ORTHOPEDICS | Facility: CLINIC | Age: 84
End: 2019-03-19
Payer: MEDICARE

## 2019-03-19 VITALS
HEART RATE: 88 BPM | HEIGHT: 65 IN | DIASTOLIC BLOOD PRESSURE: 60 MMHG | SYSTOLIC BLOOD PRESSURE: 96 MMHG | BODY MASS INDEX: 17.33 KG/M2 | WEIGHT: 104 LBS

## 2019-03-19 DIAGNOSIS — S42.034A CLOSED NONDISPLACED FRACTURE OF ACROMIAL END OF RIGHT CLAVICLE, INITIAL ENCOUNTER: Primary | ICD-10-CM

## 2019-03-19 PROCEDURE — 1111F PR DISCHARGE MEDS RECONCILED W/ CURRENT OUTPATIENT MED LIST: ICD-10-PCS | Mod: ,,, | Performed by: ORTHOPAEDIC SURGERY

## 2019-03-19 PROCEDURE — 1101F PT FALLS ASSESS-DOCD LE1/YR: CPT | Mod: ,,, | Performed by: ORTHOPAEDIC SURGERY

## 2019-03-19 PROCEDURE — 99213 PR OFFICE/OUTPT VISIT, EST, LEVL III, 20-29 MIN: ICD-10-PCS | Mod: 57,,, | Performed by: ORTHOPAEDIC SURGERY

## 2019-03-19 PROCEDURE — 3074F PR MOST RECENT SYSTOLIC BLOOD PRESSURE < 130 MM HG: ICD-10-PCS | Mod: ,,, | Performed by: ORTHOPAEDIC SURGERY

## 2019-03-19 PROCEDURE — 99213 OFFICE O/P EST LOW 20 MIN: CPT | Mod: 57,,, | Performed by: ORTHOPAEDIC SURGERY

## 2019-03-19 PROCEDURE — 1101F PR PT FALLS ASSESS DOC 0-1 FALLS W/OUT INJ PAST YR: ICD-10-PCS | Mod: ,,, | Performed by: ORTHOPAEDIC SURGERY

## 2019-03-19 PROCEDURE — 3078F DIAST BP <80 MM HG: CPT | Mod: ,,, | Performed by: ORTHOPAEDIC SURGERY

## 2019-03-19 PROCEDURE — 23500 CLTX CLAVICULAR FX W/O MNPJ: CPT | Mod: RT,,, | Performed by: ORTHOPAEDIC SURGERY

## 2019-03-19 PROCEDURE — 1111F DSCHRG MED/CURRENT MED MERGE: CPT | Mod: ,,, | Performed by: ORTHOPAEDIC SURGERY

## 2019-03-19 PROCEDURE — 3074F SYST BP LT 130 MM HG: CPT | Mod: ,,, | Performed by: ORTHOPAEDIC SURGERY

## 2019-03-19 PROCEDURE — 3078F PR MOST RECENT DIASTOLIC BLOOD PRESSURE < 80 MM HG: ICD-10-PCS | Mod: ,,, | Performed by: ORTHOPAEDIC SURGERY

## 2019-03-19 PROCEDURE — 23500 PR CLOSED RX CLAVICLE FRACTURE: ICD-10-PCS | Mod: RT,,, | Performed by: ORTHOPAEDIC SURGERY

## 2019-03-19 RX ORDER — SERTRALINE HYDROCHLORIDE 25 MG/1
25 TABLET, FILM COATED ORAL DAILY
Refills: 5 | COMMUNITY
Start: 2018-12-21

## 2019-03-19 RX ORDER — ALENDRONATE SODIUM 70 MG/1
70 TABLET ORAL
Refills: 5 | COMMUNITY
Start: 2019-02-22

## 2019-05-06 ENCOUNTER — TELEPHONE (OUTPATIENT)
Dept: ADMINISTRATIVE | Facility: CLINIC | Age: 84
End: 2019-05-06

## 2019-05-06 NOTE — TELEPHONE ENCOUNTER
Home Health SOC 04/13/2019 - 06/11/2019 with [facility] - Dr. Fritz Castellano.  services.

## 2019-05-23 ENCOUNTER — OFFICE VISIT (OUTPATIENT)
Dept: ORTHOPEDICS | Facility: CLINIC | Age: 84
End: 2019-05-23
Payer: MEDICARE

## 2019-05-23 VITALS
HEART RATE: 68 BPM | BODY MASS INDEX: 17.33 KG/M2 | SYSTOLIC BLOOD PRESSURE: 110 MMHG | WEIGHT: 104 LBS | HEIGHT: 65 IN | DIASTOLIC BLOOD PRESSURE: 70 MMHG

## 2019-05-23 DIAGNOSIS — S42.034D CLOSED NONDISPLACED FRACTURE OF ACROMIAL END OF RIGHT CLAVICLE WITH ROUTINE HEALING, SUBSEQUENT ENCOUNTER: Primary | ICD-10-CM

## 2019-05-23 PROCEDURE — 99024 PR POST-OP FOLLOW-UP VISIT: ICD-10-PCS | Mod: ,,, | Performed by: ORTHOPAEDIC SURGERY

## 2019-05-23 PROCEDURE — 99024 POSTOP FOLLOW-UP VISIT: CPT | Mod: ,,, | Performed by: ORTHOPAEDIC SURGERY

## 2019-05-23 PROCEDURE — 73000 X-RAY EXAM OF COLLAR BONE: CPT | Mod: RT,,, | Performed by: ORTHOPAEDIC SURGERY

## 2019-05-23 PROCEDURE — 73000 PR  X-RAY CLAVICLE: ICD-10-PCS | Mod: RT,,, | Performed by: ORTHOPAEDIC SURGERY
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
## 2019-05-31 NOTE — ASSESSMENT & PLAN NOTE
[facility] Call Center    Phone Message    May a detailed message be left on voicemail: yes    Reason for Call: Order(s): Home Care Orders: Skilled Nursin TIMES A MONTH FOR 2 MONTHS, 1 TIME A MONTH FOR 1 MONTH, 3 as needed    Ongoing catheter orders for catheter management - nurse to change catheter every two week with a 16 french BC syringe - irrigate with 50cc of normal saline and as needed - care giver can irrigate catheter on non-nursing days    Action Taken: Message routed to:  [facility] ([facility] BILL   Suspect related to surgery  Add procrit, MVI, iron, pm vitamin C  Monitor

## 2023-01-17 NOTE — HPI
83 y/o with 2 episodes of pna with hospitalization, appears to be right lower lobe.  Patient denies any reflux, heartburn, regurgitation, nausea, and vomiting to me.  She says multiple times she doesn't think she needs surgery as I am interviewing her.  
Ms. Stacy is an 83yo F with a PMH of DM, thyroid disease, diverticulitis, Crohn's disease, HLD, and COPD. She was discharged from [facility] 1.5 weeks ago for pneumonia, after being there for 2 weeks. She presents to the ED with c/o fever. Her daughter reports that it was 103.0 at home. Her appetite has been good. She had her sacral wound debrided 2/26. While in the ED, her CXR showed a RLL pneumonia and IV Azactam, Azithromycin, and Vancomycin were given. Her daughter is at bedside and is the primary historian. She requests IV tylenol for pain. Code status discussed and she requests a DNR status.       
all other ROS negative except as per HPI